# Patient Record
Sex: MALE | Race: WHITE | Employment: OTHER | URBAN - NONMETROPOLITAN AREA
[De-identification: names, ages, dates, MRNs, and addresses within clinical notes are randomized per-mention and may not be internally consistent; named-entity substitution may affect disease eponyms.]

---

## 2018-06-25 ENCOUNTER — OFFICE VISIT (OUTPATIENT)
Dept: ORTHOPEDIC SURGERY | Age: 68
End: 2018-06-25

## 2018-06-25 VITALS
WEIGHT: 165 LBS | BODY MASS INDEX: 23.62 KG/M2 | DIASTOLIC BLOOD PRESSURE: 72 MMHG | SYSTOLIC BLOOD PRESSURE: 162 MMHG | HEART RATE: 87 BPM | HEIGHT: 70 IN

## 2018-06-25 DIAGNOSIS — M67.919 ROTATOR CUFF DISORDER, UNSPECIFIED LATERALITY: Primary | ICD-10-CM

## 2018-06-25 DIAGNOSIS — M70.21 OLECRANON BURSITIS OF RIGHT ELBOW: ICD-10-CM

## 2018-06-25 PROBLEM — K80.10 CHRONIC CALCULOUS CHOLECYSTITIS: Status: ACTIVE | Noted: 2018-06-25

## 2018-06-25 PROCEDURE — G8420 CALC BMI NORM PARAMETERS: HCPCS | Performed by: ORTHOPAEDIC SURGERY

## 2018-06-25 PROCEDURE — 4040F PNEUMOC VAC/ADMIN/RCVD: CPT | Performed by: ORTHOPAEDIC SURGERY

## 2018-06-25 PROCEDURE — 4004F PT TOBACCO SCREEN RCVD TLK: CPT | Performed by: ORTHOPAEDIC SURGERY

## 2018-06-25 PROCEDURE — 3017F COLORECTAL CA SCREEN DOC REV: CPT | Performed by: ORTHOPAEDIC SURGERY

## 2018-06-25 PROCEDURE — 1123F ACP DISCUSS/DSCN MKR DOCD: CPT | Performed by: ORTHOPAEDIC SURGERY

## 2018-06-25 PROCEDURE — 99203 OFFICE O/P NEW LOW 30 MIN: CPT | Performed by: ORTHOPAEDIC SURGERY

## 2018-06-25 PROCEDURE — G8427 DOCREV CUR MEDS BY ELIG CLIN: HCPCS | Performed by: ORTHOPAEDIC SURGERY

## 2018-06-25 RX ORDER — INSULIN GLARGINE 100 [IU]/ML
12 INJECTION, SOLUTION SUBCUTANEOUS DAILY
COMMUNITY
Start: 2018-05-11 | End: 2019-04-25 | Stop reason: ALTCHOICE

## 2018-06-25 RX ORDER — ATORVASTATIN CALCIUM 80 MG/1
80 TABLET, FILM COATED ORAL DAILY
COMMUNITY
Start: 2018-06-21

## 2018-07-10 ENCOUNTER — OFFICE VISIT (OUTPATIENT)
Dept: ORTHOPEDIC SURGERY | Age: 68
End: 2018-07-10

## 2018-07-10 VITALS
HEART RATE: 77 BPM | BODY MASS INDEX: 23.62 KG/M2 | HEIGHT: 70 IN | DIASTOLIC BLOOD PRESSURE: 97 MMHG | WEIGHT: 165 LBS | SYSTOLIC BLOOD PRESSURE: 174 MMHG

## 2018-07-10 DIAGNOSIS — Z72.0 TOBACCO ABUSE: ICD-10-CM

## 2018-07-10 DIAGNOSIS — M75.121 COMPLETE TEAR OF RIGHT ROTATOR CUFF: Primary | ICD-10-CM

## 2018-07-10 PROCEDURE — L3670 SO ACRO/CLAV CAN WEB PRE OTS: HCPCS | Performed by: ORTHOPAEDIC SURGERY

## 2018-07-10 PROCEDURE — 4040F PNEUMOC VAC/ADMIN/RCVD: CPT | Performed by: ORTHOPAEDIC SURGERY

## 2018-07-10 PROCEDURE — G8420 CALC BMI NORM PARAMETERS: HCPCS | Performed by: ORTHOPAEDIC SURGERY

## 2018-07-10 PROCEDURE — 3017F COLORECTAL CA SCREEN DOC REV: CPT | Performed by: ORTHOPAEDIC SURGERY

## 2018-07-10 PROCEDURE — 4000F TOBACCO USE TXMNT COUNSELING: CPT | Performed by: ORTHOPAEDIC SURGERY

## 2018-07-10 PROCEDURE — G8427 DOCREV CUR MEDS BY ELIG CLIN: HCPCS | Performed by: ORTHOPAEDIC SURGERY

## 2018-07-10 PROCEDURE — 99214 OFFICE O/P EST MOD 30 MIN: CPT | Performed by: ORTHOPAEDIC SURGERY

## 2018-07-10 PROCEDURE — 4004F PT TOBACCO SCREEN RCVD TLK: CPT | Performed by: ORTHOPAEDIC SURGERY

## 2018-07-10 PROCEDURE — 1123F ACP DISCUSS/DSCN MKR DOCD: CPT | Performed by: ORTHOPAEDIC SURGERY

## 2018-07-10 NOTE — PROGRESS NOTES
are negative. Strength is graded 5/5 in all muscle groups. The distal neurovascular exam is grossly intact. Cervical spine: The skin is warm and dry. There is no swelling, warmth, or erythema. Range of motion is within normal limits. There is no paraspinal or spinous process tenderness. Spurling's sign is negative and did not produce shoulder pain. The distal neurovascular exam is grossly intact. Additional Comments: Sensation is intact to light touch throughout the median, ulnar and radial nerve distribution. Able to wiggle fingers, give thumbs up, A-OK and cross index and middle fingers. X-RAYS:  3 views of the right shoulder are reviewed. There is no periosteal reaction, medullary lesions, or osteopenia. Glenohumeral space is well maintained, the acromioclavicular joint space is decreased joint space and moderate degenerative change. The Acromiohumeral space is approximately 6 mm. Type 1 acromion. The lung fields are clear. MRI images of the right shoulder impression reviewed and show:  Supraspinatus and subscapularis tendinosis. Complete full-thickness tear of   the supraspinatus with medial retraction of about 2.8 cm. Tear extends into   the anterior fibers of the infraspinatus. Full-thickness tear of the superior fibers subscapularis with retraction of torn fibers 1 cm. Interstitial tearing of the mid and inferior fibers with involvement of about 50% fiber thickness. Mild-to-moderate atrophy of the supraspinatus, infraspinatus, and subscapularis. Small glenohumeral joint effusion and synovitis. Subacromial subdeltoid fluid is well as subcoracoid and subscapularis recess fluid which appears somewhat loculated. Biceps tenosynovitis. Biceps tendon is perched on the lesser tuberosity without overt dislocation. Positive tension sign and stage II to 3 goutallier atrophy    Assessment:  Massive retracted rotator cuff tear    Impression:  Encounter Diagnoses   Name Primary?    

## 2018-08-07 NOTE — PROGRESS NOTES
Pt states unsure if he is having surgery. Wants to talk with PCP, he has an appointment with them 8/9, I told him we will call him 8/10 for PAT call.

## 2018-08-14 ENCOUNTER — ANESTHESIA EVENT (OUTPATIENT)
Dept: OPERATING ROOM | Age: 68
End: 2018-08-14
Payer: MEDICARE

## 2018-08-14 NOTE — ANESTHESIA PRE PROCEDURE
Past Surgical History:     CHOLECYSTECTOMY  2016    COLONOSCOPY      HAND SURGERY Right      Social History:     Smoking status: Current Every Day Smoker     Packs/day: 1.00    Smokeless tobacco: Never Used      Comment: Using the E cigarette    Alcohol use No      Comment: occasionally couple times a month                                Ready to quit: Not Answered;   Counseling given: Not Answered    Vital Signs (Current):  BP: 119/70  Pulse: 73 Resp: 16  SpO2: 99 Temp: 98  Height: 5' 10\" (1.778 m)  (08/15/18)  Weight: 170 lb (77.1 kg)  (08/15/18) BMI: 24.4    EK18  NSR 82; nl axis; low volts; 1stdegree AV Block  Stress SPECT: 2018   Satisfactory activity throughout the left ventricle myocardium at stress and at rest.  LVEF: 63 %; LV WALL MOTION: Normal without areas of hypo- or dyskinesia. NPO Status:  >8 hrs    Anesthesia Evaluation  Patient summary reviewed and Nursing notes reviewed  Airway: Mallampati: II  TM distance: >3 FB     Mouth opening: > = 3 FB Dental:          Pulmonary:                              Cardiovascular:  Exercise tolerance: good (>4 METS),   (+) hypertension:, hyperlipidemia    (-) CABG/stent, dysrhythmias and  ANGEL                Neuro/Psych:               GI/Hepatic/Renal:   (+) GERD:,           Endo/Other:    (+) DiabetesType II DM, using insulin, : arthritis: OA., .    (-) hypothyroidism               Abdominal:           Vascular:                                        Anesthesia Plan      general     ASA 3     (Right Interscalene Brachial Plexus Block for post-op pain management per surgeon request.)      MIPS: Prophylactic antiemetics administered. Anesthetic plan and risks discussed with patient.                 Calli Dixon MD

## 2018-08-15 ENCOUNTER — HOSPITAL ENCOUNTER (OUTPATIENT)
Age: 68
Setting detail: OUTPATIENT SURGERY
Discharge: HOME | End: 2018-08-15
Attending: ORTHOPAEDIC SURGERY | Admitting: ORTHOPAEDIC SURGERY
Payer: MEDICARE

## 2018-08-15 ENCOUNTER — ANESTHESIA (OUTPATIENT)
Dept: OPERATING ROOM | Age: 68
End: 2018-08-15
Payer: MEDICARE

## 2018-08-15 VITALS
BODY MASS INDEX: 24.34 KG/M2 | WEIGHT: 170 LBS | TEMPERATURE: 97 F | HEART RATE: 95 BPM | RESPIRATION RATE: 16 BRPM | HEIGHT: 70 IN | SYSTOLIC BLOOD PRESSURE: 133 MMHG | OXYGEN SATURATION: 94 % | DIASTOLIC BLOOD PRESSURE: 79 MMHG

## 2018-08-15 VITALS — DIASTOLIC BLOOD PRESSURE: 67 MMHG | OXYGEN SATURATION: 97 % | SYSTOLIC BLOOD PRESSURE: 117 MMHG

## 2018-08-15 DIAGNOSIS — M67.919 ROTATOR CUFF DISORDER, UNSPECIFIED LATERALITY: Primary | ICD-10-CM

## 2018-08-15 LAB
GLUCOSE BLD-MCNC: 155 MG/DL (ref 70–99)
GLUCOSE BLD-MCNC: 193 MG/DL (ref 70–99)
PERFORMED ON: ABNORMAL
PERFORMED ON: ABNORMAL

## 2018-08-15 PROCEDURE — 7100000010 HC PHASE II RECOVERY - FIRST 15 MIN: Performed by: ORTHOPAEDIC SURGERY

## 2018-08-15 PROCEDURE — 6360000002 HC RX W HCPCS: Performed by: ORTHOPAEDIC SURGERY

## 2018-08-15 PROCEDURE — 64415 NJX AA&/STRD BRCH PLXS IMG: CPT | Performed by: ANESTHESIOLOGY

## 2018-08-15 PROCEDURE — 6360000002 HC RX W HCPCS: Performed by: NURSE ANESTHETIST, CERTIFIED REGISTERED

## 2018-08-15 PROCEDURE — C1713 ANCHOR/SCREW BN/BN,TIS/BN: HCPCS | Performed by: ORTHOPAEDIC SURGERY

## 2018-08-15 PROCEDURE — 7100000001 HC PACU RECOVERY - ADDTL 15 MIN: Performed by: ORTHOPAEDIC SURGERY

## 2018-08-15 PROCEDURE — 7100000000 HC PACU RECOVERY - FIRST 15 MIN: Performed by: ORTHOPAEDIC SURGERY

## 2018-08-15 PROCEDURE — 2580000003 HC RX 258: Performed by: ANESTHESIOLOGY

## 2018-08-15 PROCEDURE — 3600000004 HC SURGERY LEVEL 4 BASE: Performed by: ORTHOPAEDIC SURGERY

## 2018-08-15 PROCEDURE — 3600000014 HC SURGERY LEVEL 4 ADDTL 15MIN: Performed by: ORTHOPAEDIC SURGERY

## 2018-08-15 PROCEDURE — 7100000011 HC PHASE II RECOVERY - ADDTL 15 MIN: Performed by: ORTHOPAEDIC SURGERY

## 2018-08-15 PROCEDURE — 2720000010 HC SURG SUPPLY STERILE: Performed by: ORTHOPAEDIC SURGERY

## 2018-08-15 PROCEDURE — 2709999900 HC NON-CHARGEABLE SUPPLY: Performed by: ORTHOPAEDIC SURGERY

## 2018-08-15 PROCEDURE — 3700000000 HC ANESTHESIA ATTENDED CARE: Performed by: ORTHOPAEDIC SURGERY

## 2018-08-15 PROCEDURE — 2500000003 HC RX 250 WO HCPCS: Performed by: NURSE ANESTHETIST, CERTIFIED REGISTERED

## 2018-08-15 PROCEDURE — 3700000001 HC ADD 15 MINUTES (ANESTHESIA): Performed by: ORTHOPAEDIC SURGERY

## 2018-08-15 PROCEDURE — 2500000003 HC RX 250 WO HCPCS: Performed by: ANESTHESIOLOGY

## 2018-08-15 DEVICE — SCREW INTFR L23MM DIA7MM BIOCRYL RAPIDE ABSRB MILAGRO ADV: Type: IMPLANTABLE DEVICE | Status: FUNCTIONAL

## 2018-08-15 DEVICE — SCREW BNE L18MM DIA4MM PERIARTC S STL ST LOK FULL THRD FOR: Type: IMPLANTABLE DEVICE | Status: FUNCTIONAL

## 2018-08-15 DEVICE — ANCHOR SUT DIA45MM BIOCRYL RAPIDE ABSRB 3 DYNACORD SZ 2: Type: IMPLANTABLE DEVICE | Status: FUNCTIONAL

## 2018-08-15 DEVICE — ANCHOR SUT DIA5.5MM BIOCRYL RAPIDE ABSRB KNOTLESS HEALIX: Type: IMPLANTABLE DEVICE | Status: FUNCTIONAL

## 2018-08-15 DEVICE — ANCHOR SUT DIA4.5MM BIOCRYL RAPIDE ABSRB 2 DYNACORD SZ 2: Type: IMPLANTABLE DEVICE | Status: FUNCTIONAL

## 2018-08-15 DEVICE — SUTURE ORTHOCORD SZ 2-0 VLT BLU MO-7 NDL MULTIPAK 223114: Type: IMPLANTABLE DEVICE | Status: FUNCTIONAL

## 2018-08-15 RX ORDER — OXYCODONE HYDROCHLORIDE AND ACETAMINOPHEN 5; 325 MG/1; MG/1
2 TABLET ORAL PRN
Status: DISCONTINUED | OUTPATIENT
Start: 2018-08-15 | End: 2018-08-15 | Stop reason: HOSPADM

## 2018-08-15 RX ORDER — MIDAZOLAM HYDROCHLORIDE 1 MG/ML
INJECTION INTRAMUSCULAR; INTRAVENOUS PRN
Status: DISCONTINUED | OUTPATIENT
Start: 2018-08-15 | End: 2018-08-15 | Stop reason: SDUPTHER

## 2018-08-15 RX ORDER — VITAMIN B COMPLEX
1 CAPSULE ORAL DAILY
COMMUNITY

## 2018-08-15 RX ORDER — EPHEDRINE SULFATE 50 MG/ML
INJECTION INTRAVENOUS PRN
Status: DISCONTINUED | OUTPATIENT
Start: 2018-08-15 | End: 2018-08-15 | Stop reason: SDUPTHER

## 2018-08-15 RX ORDER — ACETAMINOPHEN 10 MG/ML
1000 INJECTION, SOLUTION INTRAVENOUS ONCE
Status: COMPLETED | OUTPATIENT
Start: 2018-08-15 | End: 2018-08-15

## 2018-08-15 RX ORDER — OXYCODONE HYDROCHLORIDE AND ACETAMINOPHEN 5; 325 MG/1; MG/1
1 TABLET ORAL PRN
Status: DISCONTINUED | OUTPATIENT
Start: 2018-08-15 | End: 2018-08-15 | Stop reason: HOSPADM

## 2018-08-15 RX ORDER — HYDRALAZINE HYDROCHLORIDE 20 MG/ML
5 INJECTION INTRAMUSCULAR; INTRAVENOUS EVERY 10 MIN PRN
Status: DISCONTINUED | OUTPATIENT
Start: 2018-08-15 | End: 2018-08-15 | Stop reason: HOSPADM

## 2018-08-15 RX ORDER — ONDANSETRON 2 MG/ML
INJECTION INTRAMUSCULAR; INTRAVENOUS PRN
Status: DISCONTINUED | OUTPATIENT
Start: 2018-08-15 | End: 2018-08-15 | Stop reason: SDUPTHER

## 2018-08-15 RX ORDER — HYDROMORPHONE HCL 110MG/55ML
0.25 PATIENT CONTROLLED ANALGESIA SYRINGE INTRAVENOUS EVERY 5 MIN PRN
Status: DISCONTINUED | OUTPATIENT
Start: 2018-08-15 | End: 2018-08-15 | Stop reason: HOSPADM

## 2018-08-15 RX ORDER — DEXAMETHASONE SODIUM PHOSPHATE 4 MG/ML
INJECTION, SOLUTION INTRA-ARTICULAR; INTRALESIONAL; INTRAMUSCULAR; INTRAVENOUS; SOFT TISSUE PRN
Status: DISCONTINUED | OUTPATIENT
Start: 2018-08-15 | End: 2018-08-15 | Stop reason: SDUPTHER

## 2018-08-15 RX ORDER — SODIUM CHLORIDE, SODIUM LACTATE, POTASSIUM CHLORIDE, CALCIUM CHLORIDE 600; 310; 30; 20 MG/100ML; MG/100ML; MG/100ML; MG/100ML
INJECTION, SOLUTION INTRAVENOUS CONTINUOUS
Status: DISCONTINUED | OUTPATIENT
Start: 2018-08-15 | End: 2018-08-15 | Stop reason: HOSPADM

## 2018-08-15 RX ORDER — PROPOFOL 10 MG/ML
INJECTION, EMULSION INTRAVENOUS PRN
Status: DISCONTINUED | OUTPATIENT
Start: 2018-08-15 | End: 2018-08-15 | Stop reason: SDUPTHER

## 2018-08-15 RX ORDER — ROCURONIUM BROMIDE 10 MG/ML
INJECTION, SOLUTION INTRAVENOUS PRN
Status: DISCONTINUED | OUTPATIENT
Start: 2018-08-15 | End: 2018-08-15 | Stop reason: SDUPTHER

## 2018-08-15 RX ORDER — HYDROMORPHONE HCL 110MG/55ML
0.5 PATIENT CONTROLLED ANALGESIA SYRINGE INTRAVENOUS EVERY 5 MIN PRN
Status: DISCONTINUED | OUTPATIENT
Start: 2018-08-15 | End: 2018-08-15 | Stop reason: HOSPADM

## 2018-08-15 RX ORDER — PANTOPRAZOLE SODIUM 40 MG/1
40 TABLET, DELAYED RELEASE ORAL DAILY
COMMUNITY

## 2018-08-15 RX ORDER — OXYCODONE HYDROCHLORIDE AND ACETAMINOPHEN 5; 325 MG/1; MG/1
1 TABLET ORAL EVERY 6 HOURS PRN
Qty: 28 TABLET | Refills: 0 | Status: SHIPPED | OUTPATIENT
Start: 2018-08-15 | End: 2018-08-21 | Stop reason: SDUPTHER

## 2018-08-15 RX ORDER — ONDANSETRON 2 MG/ML
4 INJECTION INTRAMUSCULAR; INTRAVENOUS
Status: DISCONTINUED | OUTPATIENT
Start: 2018-08-15 | End: 2018-08-15 | Stop reason: HOSPADM

## 2018-08-15 RX ORDER — MEPERIDINE HYDROCHLORIDE 25 MG/ML
12.5 INJECTION INTRAMUSCULAR; INTRAVENOUS; SUBCUTANEOUS EVERY 5 MIN PRN
Status: DISCONTINUED | OUTPATIENT
Start: 2018-08-15 | End: 2018-08-15 | Stop reason: HOSPADM

## 2018-08-15 RX ORDER — LIDOCAINE HYDROCHLORIDE 20 MG/ML
INJECTION, SOLUTION INFILTRATION; PERINEURAL PRN
Status: DISCONTINUED | OUTPATIENT
Start: 2018-08-15 | End: 2018-08-15 | Stop reason: SDUPTHER

## 2018-08-15 RX ORDER — FENTANYL CITRATE 50 UG/ML
25 INJECTION, SOLUTION INTRAMUSCULAR; INTRAVENOUS EVERY 5 MIN PRN
Status: DISCONTINUED | OUTPATIENT
Start: 2018-08-15 | End: 2018-08-15 | Stop reason: HOSPADM

## 2018-08-15 RX ORDER — LIDOCAINE HYDROCHLORIDE 10 MG/ML
1 INJECTION, SOLUTION EPIDURAL; INFILTRATION; INTRACAUDAL; PERINEURAL
Status: COMPLETED | OUTPATIENT
Start: 2018-08-15 | End: 2018-08-15

## 2018-08-15 RX ORDER — FENTANYL CITRATE 50 UG/ML
INJECTION, SOLUTION INTRAMUSCULAR; INTRAVENOUS PRN
Status: DISCONTINUED | OUTPATIENT
Start: 2018-08-15 | End: 2018-08-15 | Stop reason: SDUPTHER

## 2018-08-15 RX ADMIN — SODIUM CHLORIDE, POTASSIUM CHLORIDE, SODIUM LACTATE AND CALCIUM CHLORIDE: 600; 310; 30; 20 INJECTION, SOLUTION INTRAVENOUS at 09:29

## 2018-08-15 RX ADMIN — MIDAZOLAM 2 MG: 1 INJECTION INTRAMUSCULAR; INTRAVENOUS at 07:33

## 2018-08-15 RX ADMIN — EPHEDRINE SULFATE 5 MG: 50 INJECTION INTRAVENOUS at 08:20

## 2018-08-15 RX ADMIN — SUGAMMADEX 150 MG: 100 INJECTION, SOLUTION INTRAVENOUS at 10:43

## 2018-08-15 RX ADMIN — PROPOFOL 150 MG: 10 INJECTION, EMULSION INTRAVENOUS at 07:57

## 2018-08-15 RX ADMIN — Medication 2 G: at 07:47

## 2018-08-15 RX ADMIN — ROCURONIUM BROMIDE 50 MG: 10 INJECTION, SOLUTION INTRAVENOUS at 07:57

## 2018-08-15 RX ADMIN — LIDOCAINE HYDROCHLORIDE 0.1 ML: 10 INJECTION, SOLUTION EPIDURAL; INFILTRATION; INTRACAUDAL; PERINEURAL at 07:09

## 2018-08-15 RX ADMIN — PHENYLEPHRINE HYDROCHLORIDE 100 MCG: 10 INJECTION INTRAVENOUS at 08:10

## 2018-08-15 RX ADMIN — EPHEDRINE SULFATE 5 MG: 50 INJECTION INTRAVENOUS at 08:17

## 2018-08-15 RX ADMIN — EPHEDRINE SULFATE 5 MG: 50 INJECTION INTRAVENOUS at 08:46

## 2018-08-15 RX ADMIN — DEXAMETHASONE SODIUM PHOSPHATE 4 MG: 4 INJECTION, SOLUTION INTRAMUSCULAR; INTRAVENOUS at 08:15

## 2018-08-15 RX ADMIN — LIDOCAINE HYDROCHLORIDE 40 MG: 20 INJECTION, SOLUTION INFILTRATION; PERINEURAL at 07:57

## 2018-08-15 RX ADMIN — PHENYLEPHRINE HYDROCHLORIDE 100 MCG: 10 INJECTION INTRAVENOUS at 10:08

## 2018-08-15 RX ADMIN — EPHEDRINE SULFATE 5 MG: 50 INJECTION INTRAVENOUS at 09:03

## 2018-08-15 RX ADMIN — EPHEDRINE SULFATE 5 MG: 50 INJECTION INTRAVENOUS at 09:11

## 2018-08-15 RX ADMIN — ACETAMINOPHEN 1000 MG: 10 INJECTION, SOLUTION INTRAVENOUS at 07:10

## 2018-08-15 RX ADMIN — FENTANYL CITRATE 50 MCG: 50 INJECTION INTRAMUSCULAR; INTRAVENOUS at 07:57

## 2018-08-15 RX ADMIN — SODIUM CHLORIDE, POTASSIUM CHLORIDE, SODIUM LACTATE AND CALCIUM CHLORIDE: 600; 310; 30; 20 INJECTION, SOLUTION INTRAVENOUS at 07:09

## 2018-08-15 RX ADMIN — EPHEDRINE SULFATE 5 MG: 50 INJECTION INTRAVENOUS at 10:00

## 2018-08-15 RX ADMIN — EPHEDRINE SULFATE 10 MG: 50 INJECTION INTRAVENOUS at 08:27

## 2018-08-15 RX ADMIN — ONDANSETRON 4 MG: 2 INJECTION, SOLUTION INTRAMUSCULAR; INTRAVENOUS at 10:42

## 2018-08-15 RX ADMIN — FENTANYL CITRATE 50 MCG: 50 INJECTION INTRAMUSCULAR; INTRAVENOUS at 07:33

## 2018-08-15 RX ADMIN — EPHEDRINE SULFATE 5 MG: 50 INJECTION INTRAVENOUS at 09:13

## 2018-08-15 RX ADMIN — EPHEDRINE SULFATE 5 MG: 50 INJECTION INTRAVENOUS at 08:39

## 2018-08-15 ASSESSMENT — PULMONARY FUNCTION TESTS
PIF_VALUE: 20
PIF_VALUE: 20
PIF_VALUE: 19
PIF_VALUE: 22
PIF_VALUE: 0
PIF_VALUE: 20
PIF_VALUE: 12
PIF_VALUE: 21
PIF_VALUE: 3
PIF_VALUE: 19
PIF_VALUE: 19
PIF_VALUE: 3
PIF_VALUE: 20
PIF_VALUE: 20
PIF_VALUE: 19
PIF_VALUE: 21
PIF_VALUE: 19
PIF_VALUE: 22
PIF_VALUE: 19
PIF_VALUE: 20
PIF_VALUE: 20
PIF_VALUE: 22
PIF_VALUE: 19
PIF_VALUE: 21
PIF_VALUE: 21
PIF_VALUE: 13
PIF_VALUE: 21
PIF_VALUE: 20
PIF_VALUE: 19
PIF_VALUE: 1
PIF_VALUE: 3
PIF_VALUE: 21
PIF_VALUE: 20
PIF_VALUE: 18
PIF_VALUE: 21
PIF_VALUE: 21
PIF_VALUE: 20
PIF_VALUE: 20
PIF_VALUE: 19
PIF_VALUE: 19
PIF_VALUE: 20
PIF_VALUE: 19
PIF_VALUE: 20
PIF_VALUE: 21
PIF_VALUE: 19
PIF_VALUE: 6
PIF_VALUE: 0
PIF_VALUE: 20
PIF_VALUE: 21
PIF_VALUE: 19
PIF_VALUE: 20
PIF_VALUE: 22
PIF_VALUE: 20
PIF_VALUE: 20
PIF_VALUE: 13
PIF_VALUE: 19
PIF_VALUE: 24
PIF_VALUE: 20
PIF_VALUE: 22
PIF_VALUE: 19
PIF_VALUE: 13
PIF_VALUE: 20
PIF_VALUE: 21
PIF_VALUE: 20
PIF_VALUE: 20
PIF_VALUE: 19
PIF_VALUE: 20
PIF_VALUE: 18
PIF_VALUE: 22
PIF_VALUE: 20
PIF_VALUE: 2
PIF_VALUE: 21
PIF_VALUE: 22
PIF_VALUE: 20
PIF_VALUE: 20
PIF_VALUE: 19
PIF_VALUE: 19
PIF_VALUE: 21
PIF_VALUE: 13
PIF_VALUE: 19
PIF_VALUE: 21
PIF_VALUE: 19
PIF_VALUE: 19
PIF_VALUE: 21
PIF_VALUE: 19
PIF_VALUE: 20
PIF_VALUE: 20
PIF_VALUE: 19
PIF_VALUE: 21
PIF_VALUE: 20
PIF_VALUE: 13
PIF_VALUE: 21
PIF_VALUE: 20
PIF_VALUE: 19
PIF_VALUE: 21
PIF_VALUE: 20
PIF_VALUE: 20
PIF_VALUE: 4
PIF_VALUE: 19
PIF_VALUE: 19
PIF_VALUE: 22
PIF_VALUE: 21
PIF_VALUE: 22
PIF_VALUE: 21
PIF_VALUE: 1
PIF_VALUE: 20
PIF_VALUE: 12
PIF_VALUE: 20
PIF_VALUE: 19
PIF_VALUE: 19
PIF_VALUE: 20
PIF_VALUE: 19
PIF_VALUE: 20
PIF_VALUE: 20
PIF_VALUE: 19
PIF_VALUE: 20
PIF_VALUE: 21
PIF_VALUE: 20
PIF_VALUE: 4
PIF_VALUE: 20
PIF_VALUE: 20
PIF_VALUE: 13
PIF_VALUE: 19
PIF_VALUE: 20
PIF_VALUE: 20
PIF_VALUE: 19
PIF_VALUE: 19
PIF_VALUE: 13
PIF_VALUE: 20
PIF_VALUE: 12
PIF_VALUE: 20
PIF_VALUE: 21
PIF_VALUE: 20
PIF_VALUE: 19
PIF_VALUE: 20
PIF_VALUE: 22
PIF_VALUE: 20
PIF_VALUE: 19
PIF_VALUE: 22
PIF_VALUE: 19
PIF_VALUE: 20
PIF_VALUE: 20
PIF_VALUE: 19
PIF_VALUE: 0
PIF_VALUE: 19
PIF_VALUE: 21
PIF_VALUE: 22
PIF_VALUE: 19
PIF_VALUE: 22
PIF_VALUE: 19
PIF_VALUE: 20
PIF_VALUE: 1
PIF_VALUE: 19
PIF_VALUE: 20
PIF_VALUE: 21
PIF_VALUE: 21
PIF_VALUE: 19
PIF_VALUE: 20
PIF_VALUE: 20
PIF_VALUE: 22
PIF_VALUE: 20
PIF_VALUE: 19

## 2018-08-15 ASSESSMENT — PAIN DESCRIPTION - DESCRIPTORS: DESCRIPTORS: ACHING;THROBBING

## 2018-08-15 ASSESSMENT — PAIN - FUNCTIONAL ASSESSMENT: PAIN_FUNCTIONAL_ASSESSMENT: 0-10

## 2018-08-15 NOTE — OP NOTE
standard fashion. The forearm was well padded and secured in the [employer] Spider extremity positioning device. A standard midglenoid posterior portal was established. The trocar and cannula were inserted. The trocar was removed and the arthroscope and inflow inserted. Systematic arthroscopy was performed and the findings are summarized below. Standard anterior portal was made with outside in technique. 1.  Superior Labrum/Biceps Tendon-  Intact biceps tendon with fraying and medial subluxation. No evidence of labral fraying  2. Anterior/Posterior Labrum- Intact without fraying  3. Rotator Cuff- The subscapularis tendon showed full thickness tear of the proximal portion. There was a full thickness tear of the supraspinatus that extended into the infraspinatus tendon  4. Inferior Axillary Recess-  No loose bodies  5. Articular Surfaces-  Intact    Intra-articular Debridement  Biceps was released from the superior glenoid tubercle. Long Head Biceps Tenodesis   A spinal needle was placed in the biceps tendon to maintain length. With the arthroscope in the lateral portal, the bicipital sheath was identified and opened. Hemostasis was controlled with electrocautery. An accessory portal was made above the axillary crease. A guide pin was drilled within the bicipital groove. An acPneumRx reamer was used to create a 25-30 mm socket. A  hole was drilled just distal to the previously reamed socket and a josé was passed from inferior to superior then retrieved out the socket. A luggage tag suture was placed around the biceps with the antegrade suture passer and threaded into the josé which was then pulled out the inferior hole. The tendon was pulled into the socket & help in place with tension on the suture and a nitinol wire. A 7x23 madeline interference screw was placed over the nitinol wire and was carefully inserted taking care not to damage the tendon.   The tendon was probed to confirm that the tendon was removed and the portals closed with 3-0 Nylon. Sterile dressings were applied. A sling was applied. The patient was awakened and taken to the postoperative area in stable condition.     Henny Bull

## 2018-08-15 NOTE — ANESTHESIA POSTPROCEDURE EVALUATION
Department of Anesthesiology  Postprocedure Note    Patient: Lara Interiano  MRN: [id number]  YOB: 1950  Date of evaluation: 8/15/2018  Time:  12:46 PM     Procedure Summary     Date:  08/15/18 Room / Location:  [address]    Anesthesia Start:  3139 Anesthesia Stop:  1108    Procedure:  RIGHT SHOULDER VIDEO ARTHROSCOPY, ROTATOR CUFF REPAIR, AUGMENTATION WITH ROTATION MEDICAL GRAFT, BICEPS TENODESIS, SUBACROMIAL DECOMPRESSION (Right Shoulder) Diagnosis:  (COMPLETE TEAR OF RIGHT ROTATOR CUFF)    Surgeon:  Cory Bonilla DO Responsible Provider:  Jose Elias Estrada MD    Anesthesia Type:  general ASA Status:  3     Anesthesia Type: general    Adama Phase I: Adama Score: 10    Adama Phase II: Adama Score: 10     Anesthesia Post Evaluation   Anesthetic Problems: no   Last Vitals:   BP: 133/79 Pulse: 95   Resp: 16 SpO2: 94   Temp: 97     Cardiovascular System Stable: yes  Respiratory Function: Airway Patent yes  ETT no  Ventilator no  Level of consciousness: awake, alert and oriented  Post-op pain: adequate analgesia  Hydration Adequate: yes  Nausea/Vomiting:no  Other Issues:     Yina Pitt MD

## 2018-08-15 NOTE — ANESTHESIA PROCEDURE NOTES
Peripheral Block    Patient location during procedure: PACU  Start time: 8/15/2018 7:25 AM  End time: 8/15/2018 7:33 AM  Staffing  Anesthesiologist: Doug Guillen  Performed: anesthesiologist   Preanesthetic Checklist  Completed: patient identified, site marked, surgical consent, pre-op evaluation, timeout performed, IV checked, risks and benefits discussed, monitors and equipment checked, anesthesia consent given, oxygen available and patient being monitored  Peripheral Block  Patient position: sitting  Prep: ChloraPrep  Patient monitoring: cardiac monitor, continuous pulse ox, frequent blood pressure checks and IV access  Block type: Brachial plexus  Laterality: right  Injection technique: single-shot  Procedures: ultrasound guided  Local infiltration: lidocaine  Infiltration strength: 2 %  Dose: 2 mL  Interscalene  Provider prep: mask and sterile gloves  Local infiltration: lidocaine  Needle  Needle gauge: 21 G  Needle length: 10 cm  Needle localization: anatomical landmarks and ultrasound guidance  Assessment  Injection assessment: negative aspiration for heme, no paresthesia on injection and local visualized surrounding nerve on ultrasound  Slow fractionated injection: yes  Hemodynamics: stable  Additional Notes  Right Interscalene Brachial Plexus Block:  Patient ID'd and Marked. Sedated with Midazolam and Fentanyl. Chlorhex prep to shoulder and neck. Bupivacaine 0.5% 5 ml sc along thw deltopectoral groove. Then, lido 2% 2 ml sc prior to placement of the block needle. Needle advanced under U/S guidance and 25 ml of Bupivacaine 0.5% injected in 5 ml aliquots. Patient tolerated well.   Reason for block: post-op pain management

## 2018-08-16 DIAGNOSIS — M67.919 ROTATOR CUFF DISORDER, UNSPECIFIED LATERALITY: Primary | ICD-10-CM

## 2018-08-21 ENCOUNTER — HOSPITAL ENCOUNTER (OUTPATIENT)
Dept: PHYSICAL THERAPY | Age: 68
Setting detail: THERAPIES SERIES
Discharge: HOME | End: 2018-08-21
Payer: MEDICARE

## 2018-08-21 ENCOUNTER — OFFICE VISIT (OUTPATIENT)
Dept: ORTHOPEDIC SURGERY | Age: 68
End: 2018-08-21

## 2018-08-21 VITALS — HEIGHT: 70 IN | BODY MASS INDEX: 23.62 KG/M2 | WEIGHT: 165 LBS

## 2018-08-21 DIAGNOSIS — M67.919 ROTATOR CUFF DISORDER, UNSPECIFIED LATERALITY: ICD-10-CM

## 2018-08-21 DIAGNOSIS — Z98.890 STATUS POST RIGHT ROTATOR CUFF REPAIR: Primary | ICD-10-CM

## 2018-08-21 PROCEDURE — 99024 POSTOP FOLLOW-UP VISIT: CPT | Performed by: ORTHOPAEDIC SURGERY

## 2018-08-21 PROCEDURE — G8985 CARRY GOAL STATUS: HCPCS

## 2018-08-21 PROCEDURE — G8984 CARRY CURRENT STATUS: HCPCS

## 2018-08-21 PROCEDURE — 97110 THERAPEUTIC EXERCISES: CPT

## 2018-08-21 PROCEDURE — 97161 PT EVAL LOW COMPLEX 20 MIN: CPT

## 2018-08-21 RX ORDER — OXYCODONE HYDROCHLORIDE AND ACETAMINOPHEN 5; 325 MG/1; MG/1
1 TABLET ORAL EVERY 6 HOURS PRN
Qty: 28 TABLET | Refills: 0 | Status: SHIPPED | OUTPATIENT
Start: 2018-08-21 | End: 2018-08-30 | Stop reason: SDUPTHER

## 2018-08-21 NOTE — FLOWSHEET NOTE
[address] [place] [address] and [facility]    Physical Therapy Daily Treatment Note  Date:  2018    Patient Name:  Christo Spencer    :  1950  MRN: [id number]  Medical/Treatment Diagnosis Information:  · Diagnosis:  Massive RTC Repair, Biceps Tenodesis, SAD 8/15/18  · Treatment Diagnosis: A83.321  Insurance/Certification information:  PT Insurance Information: Medicare/Medicaid  Physician Information:  Referring Practitioner: Aidan Whatley of care signed (Y/N):     Date of Patient follow up with Physician:  2018    G-Code (if applicable):      Date G-Code Applied:  2018  PT G-Codes  Functional Assessment Tool Used: Rajani Brown 11  Score: 100%  Functional Limitation: Carrying, moving and handling objects  Carrying, Moving and Handling Objects Current Status (): 100 percent impaired, limited or restricted  Carrying, Moving and Handling Objects Goal Status ():  At least 1 percent but less than 20 percent impaired, limited or restricted    Progress Note: [x]  Yes  []  No      Latex Allergy:  [x]NO      []YES     Preferred Language for Healthcare:   [x]English       []other:     Visit # Insurance Allowable   1 Med Nec     Pain level:  10/10     SUBJECTIVE:  See eval    OBJECTIVE: See eval  Observation:   Test measurements:    Patient educated on following:    RESTRICTIONS/PRECAUTIONS: Massive RTC repair    Exercises/Interventions:   Therapeutic Ex Wt/Sets/Reps/Hold Notes   Pulley          Shrugs 20 x    Shoulder Pinches  20 x    PROM Shoulder flex 10 x    PROM Shoulder ER 10 x    Ball Squeezes 20 x                                                           Manual     Gr I-II mobs for tissue reactivity     PROM-all planes 5' Flex only, pt not tolerate others severe increase in pain   GR III-IV mobs for arthrokinematics     Cervical/long axis distraction     Thoracic PA mobs     PNF for strengthening     PNF for agonist/antagonist inhibition          Pt education 10' Pt ed with precautions and HEP and proper performance     Therapeutic Exercise and NMR EXR  [x] (85322) Provided verbal/tactile cueing for activities related to strengthening, flexibility, endurance, ROM  for improvements in scapular, scapulothoracic and UE control with self care, reaching, carrying, lifting, house/yardwork, driving/computer work. [x] (04733) Provided verbal/tactile cueing for activities related to improving balance, coordination, kinesthetic sense, posture, motor skill, proprioception  to assist with  scapular, scapulothoracic and UE control with self care, reaching, carrying, lifting, house/yardwork, driving/computer work.     Home Exercise Program:    [x] (38159) Reviewed/Progressed HEP activities related to strengthening, flexibility, endurance, ROM of scapular, scapulothoracic and UE control with self care, reaching, carrying, lifting, house/yardwork, driving/computer work  [x] (17921) Reviewed/Progressed HEP activities related to improving balance, coordination, kinesthetic sense, posture, motor skill, proprioception of scapular, scapulothoracic and UE control with self care, reaching, carrying, lifting, house/yardwork, driving/computer work      Manual Treatments:  PROM / STM / Oscillations-Mobs:  G-I, II, III, IV (PA's, Inf., Post.)  [x] (38396) Provided manual therapy to mobilize soft tissue/joints of cervical/CT, scapular GHJ and UE for the purpose of modulating pain, promoting relaxation,  increasing ROM, reducing/eliminating soft tissue swelling/inflammation/restriction, improving soft tissue extensibility and allowing for proper ROM for normal function with self care, reaching, carrying, lifting, house/yardwork, driving/computer work    Modalities:  CP x 10'    Charges:  Timed Code Treatment Minutes: 15'   Total Treatment Minutes: 39'     [x] EVAL (LOW) 76965 (typically 20 minutes face-to-face)  [] EVAL (MOD) 21686 (typically 30 minutes face-to-face)  [] EVAL (HIGH) 93666 (typically 45 minutes face-to-face)  [] RE-EVAL     [x] ZE(45085) x  1   [] Ionto  [] NMR (17372) x      [] Vaso  [] Manual (28194) x       [] Mech Traction  [] ES (unattended):   [x] Other: CP     GOALS:  Short Term Goals: To be achieved in: 2 weeks  1. Independent in HEP and progression per patient tolerance, in order to prevent re-injury. 2. Patient will have a decrease in pain to facilitate improvement in movement, function, and ADLs as indicated by Functional Deficits.     Long Term Goals: To be achieved in: 10 weeks  1. Disability index score of 10% or less for the Quick Dash to assist with reaching prior level of function. 2. Patient will demonstrate increased AROM to equal the opposite side bilaterally to allow for proper joint functioning as indicated by patients Functional Deficits. 3. Patient will demonstrate an increase in strength of R UE = L UE to allow for proper functional mobility as indicated by patients Functional Deficits. 4. Patient will return to all transfers, work activities, and functional activities without increased symptoms or restriction. 5. Patient will have 0/10 pain with ADL's.  6. Patient stated goal: to have complete recovery and get back to doing everything done before sx.                                      Goals that are underlined signify the goal has been accomplished. Progression Towards Functional goals:  [] Patient is progressing as expected towards functional goals listed. [] Progression is slowed due to complexities listed. [] Progression has been slowed due to co-morbidities.   [x] Plan just implemented, too soon to assess goals progression  [] Other:     ASSESSMENT:  See eval    Treatment/Activity Tolerance:   [x] Patient tolerated treatment well [] Patient limited by fatique  [] Patient limited by pain  [] Patient limited by other medical complications  [] Other:     Prognosis: [x] Good [] Fair  [] Poor    Patient Requires Follow-up: [x] Yes  [] No    PLAN: See eval  [] Continue per plan of care [] Alter current plan (see comments)  [x] Plan of care initiated [] Hold pending MD visit [] Discharge    Electronically signed by: Akanksha Baum PT

## 2018-08-21 NOTE — PLAN OF CARE
100 percent impaired, limited or restricted  Carrying, Moving and Handling Objects Goal Status (): At least 1 percent but less than 20 percent impaired, limited or restricted    Pain Scale: 10/10  Easing factors: ice, medication  Provocative factors: positioning, any movement    Type: [x]Constant   []Intermittent  []Radiating []Localized []other:     Numbness/Tingling: N&T in hand/fingers    Functional Limitations/Impairments: [x]Lifting/reaching [x]Grooming [x]Carrying    [x]ADL's [x]Driving [x]Sports/Recreations   [x]Other: interference with sleep    Occupation/School: Retired construction/farming    Living Status/Prior Level of Function: Independent with ADLs and IADLs. OBJECTIVE:     CERV ROM     Cervical Flexion WFL    Cervical Extension WFL    Cervical SB WFL    Cervical rotation WFL         ROM Left Right   Shoulder Flex  49 °    Shoulder Abd  45 °    Shoulder ER  Unable to tolerate any PROM   Shoulder IR  Unable to tolerate any PROM   Elbow Flex     Elbow Ext     Wrist Flex     Wrist Ext     Strength  Left Right   Shoulder Flex 4/5 N.T. Shoulder Scap 4/5 N.T. Shoulder ER 4/5 N.T. Shoulder IR 4/5 N.T.   Elbow Flex 4+/5 N.T.   Elbow Ext 4+/5 N.T.   Wrist Flex 4+/5 N.T.   Wrist Ext 4+/5 N.T.   Isaias   N.T. Reflexes/Sensation (myotomes/dermatomes):    [x]Normal    []Abnormal:      Joint mobility:    [x]Normal    []Hypo   []Hyper    Palpation: increased pain and ache with mild palpation    Functional Mobility/Transfers: slowed due to non-use of R UE    Posture:  Forward shoulders with R shoulder guard     Bandages/Dressings/Incisions: surgical inscisions with steri strips    Gait (include devices/WB status): WNL    Orthopedic Special Tests: n/a    Provocative Test Right Left    Shoulder Positive Negative Positive Negative   Jerry-Elias [] [] [] []   Empty Can [] [] [] []   Cross arm test [] [] [] []   Sanches's [] [] [] []   Apprehension test [] [] [] []   Speed's/Yergason's [] [] [] [] Provocative Test Right Left    Cervical/Thoracic  Positive Negative Positive Negative   Sharp-Adilson [] [] [] []   Alar ligament test [] [] [] []   Compression/distraction [] [] [] []   Jump sign (MTRP's) [] [] [] []   Adson's/Alex's [] [] [] []   Felipa's [] [] [] []                          [x] Patient history, allergies, meds reviewed. Medical chart reviewed. See intake form. Review Of Systems (ROS):  [x]Performed Review of systems (Integumentary, CardioPulmonary, Neurological) by intake and observation. Intake form has been scanned into medical record. Patient has been instructed to contact their primary care physician regarding ROS issues if not already being addressed at this time.       Co-morbidities/Complexities (which will affect course of rehabilitation):   []None           Arthritic conditions   []Rheumatoid arthritis (M05.9)  []Osteoarthritis (M19.91)   Cardiovascular conditions   []Hypertension (I10)  []Hyperlipidemia (E78.5)  []Angina pectoris (I20)  []Atherosclerosis (I70)   Musculoskeletal conditions   []Disc pathology   []Congenital spine pathologies   []Prior surgical intervention  []Osteoporosis (M81.8)  []Osteopenia (M85.8)   Endocrine conditions   []Hypothyroid (E03.9)  []Hyperthyroid Gastrointestinal conditions   []Constipation (G96.09)   Metabolic conditions   []Morbid obesity (E66.01)  [x]Diabetes type 1(E10.65) or 2 (E11.65)   []Neuropathy (G60.9)     Pulmonary conditions   []Asthma (J45)  []Coughing   []COPD (J44.9)   Psychological Disorders  [x]Anxiety (F41.9)  [x]Depression (F32.9)   []Other:   []Other:          Barriers to/and or personal factors that will affect rehab potential:              []Age  []Sex              []Motivation/Lack of Motivation                        [x]Co-Morbidities              []Cognitive Function, education/learning barriers              []Environmental, home barriers              []profession/work barriers  []past PT/medical

## 2018-08-30 DIAGNOSIS — M67.919 ROTATOR CUFF DISORDER, UNSPECIFIED LATERALITY: ICD-10-CM

## 2018-08-30 RX ORDER — OXYCODONE HYDROCHLORIDE AND ACETAMINOPHEN 5; 325 MG/1; MG/1
1 TABLET ORAL EVERY 6 HOURS PRN
Qty: 28 TABLET | Refills: 0 | Status: SHIPPED | OUTPATIENT
Start: 2018-08-30 | End: 2018-09-06 | Stop reason: SDUPTHER

## 2018-09-06 DIAGNOSIS — M67.919 ROTATOR CUFF DISORDER, UNSPECIFIED LATERALITY: ICD-10-CM

## 2018-09-07 RX ORDER — OXYCODONE HYDROCHLORIDE AND ACETAMINOPHEN 5; 325 MG/1; MG/1
1 TABLET ORAL EVERY 6 HOURS PRN
Qty: 28 TABLET | Refills: 0 | Status: SHIPPED | OUTPATIENT
Start: 2018-09-07 | End: 2018-09-17 | Stop reason: SDUPTHER

## 2018-09-11 ENCOUNTER — HOSPITAL ENCOUNTER (OUTPATIENT)
Dept: PHYSICAL THERAPY | Age: 68
Setting detail: THERAPIES SERIES
Discharge: HOME | End: 2018-09-11
Payer: MEDICARE

## 2018-09-11 PROCEDURE — 97140 MANUAL THERAPY 1/> REGIONS: CPT

## 2018-09-11 PROCEDURE — 97110 THERAPEUTIC EXERCISES: CPT

## 2018-09-11 NOTE — FLOWSHEET NOTE
[address] [place] [address] [facility]    Physical Therapy Daily Treatment Note  Date:  2018    Patient Name:  Fadi Toscano    :  1950  MRN: [id number]  Medical/Treatment Diagnosis Information:   Diagnosis:  Massive RTC Repair, Biceps Tenodesis, SAD 8/15/18  Treatment Diagnosis: L71.512   Insurance/Certification information: PT Insurance Information: Medicare/Medicaid    Physician Information:   Referring Practitioner: Maile Pump of care signed (Y/N):     Date of Patient follow up with Physician:  2018    G-Code (if applicable):      Date G-Code Applied:  2018       Progress Note: [x]  Yes  []  No      Latex Allergy:  [x]NO      []YES     Preferred Language for Healthcare:   [x]English       []other:     Visit # Insurance Allowable   2 Med Nec     Pain level:  8-9/10     SUBJECTIVE:  Patient reports pain still being way up there. Patient reports having been doing HEP but having trouble getting arm up with PROM Flex.     OBJECTIVE: See eval  Observation:   Test measurements:    Patient educated on following:    RESTRICTIONS/PRECAUTIONS: Massive RTC repair    Exercises/Interventions:   Therapeutic Ex Wt/Sets/Reps/Hold Notes   Pulley          Shrugs 20 x    Shoulder Pinches  20 x    PROM Shoulder flex 10 x    PROM Shoulder ER 10 x    Ball Squeezes 20 x                                                           Manual     Gr I-II mobs for tissue reactivity     PROM-all planes 10' Flex only, pt not tolerate others severe increase in pain   GR III-IV mobs for arthrokinematics     Cervical/long axis distraction     Thoracic PA mobs     PNF for strengthening     PNF for agonist/antagonist inhibition          Pt education 10' Pt ed with precautions and HEP and proper performance     Therapeutic Exercise and NMR EXR  [x] (31894) Provided verbal/tactile cueing for activities related to strengthening, flexibility, endurance, ROM  for improvements in scapular, scapulothoracic and UE control with self care, reaching, carrying, lifting, house/yardwork, driving/computer work. [x] (85875) Provided verbal/tactile cueing for activities related to improving balance, coordination, kinesthetic sense, posture, motor skill, proprioception  to assist with  scapular, scapulothoracic and UE control with self care, reaching, carrying, lifting, house/yardwork, driving/computer work. Home Exercise Program:    [x] (68438) Reviewed/Progressed HEP activities related to strengthening, flexibility, endurance, ROM of scapular, scapulothoracic and UE control with self care, reaching, carrying, lifting, house/yardwork, driving/computer work  [x] (35749) Reviewed/Progressed HEP activities related to improving balance, coordination, kinesthetic sense, posture, motor skill, proprioception of scapular, scapulothoracic and UE control with self care, reaching, carrying, lifting, house/yardwork, driving/computer work      Manual Treatments:  PROM / STM / Oscillations-Mobs:  G-I, II, III, IV (PA's, Inf., Post.)  [x] (29248) Provided manual therapy to mobilize soft tissue/joints of cervical/CT, scapular GHJ and UE for the purpose of modulating pain, promoting relaxation,  increasing ROM, reducing/eliminating soft tissue swelling/inflammation/restriction, improving soft tissue extensibility and allowing for proper ROM for normal function with self care, reaching, carrying, lifting, house/yardwork, driving/computer work    Modalities:  CP x 10'    Charges:  Timed Code Treatment Minutes: 25'   Total Treatment Minutes: 28'     [x] EVAL (LOW) 00946 (typically 20 minutes face-to-face)  [] EVAL (MOD) 21504 (typically 30 minutes face-to-face)  [] EVAL (HIGH) 97527 (typically 45 minutes face-to-face)  [] RE-EVAL     [x] XG(63576) x  1   [] Ionto  [] NMR (30822) x      [] Vaso  [x] Manual (65080) x  1    [] Mech Traction  [] ES (unattended):   [x] Other: CP     GOALS:  Short Term Goals:  To be achieved in: 2 weeks  1. Independent in HEP and progression per patient tolerance, in order to prevent re-injury. 2. Patient will have a decrease in pain to facilitate improvement in movement, function, and ADLs as indicated by Functional Deficits.     Long Term Goals: To be achieved in: 10 weeks  1. Disability index score of 10% or less for the Quick Dash to assist with reaching prior level of function. 2. Patient will demonstrate increased AROM to equal the opposite side bilaterally to allow for proper joint functioning as indicated by patients Functional Deficits. 3. Patient will demonstrate an increase in strength of R UE = L UE to allow for proper functional mobility as indicated by patients Functional Deficits. 4. Patient will return to all transfers, work activities, and functional activities without increased symptoms or restriction. 5. Patient will have 0/10 pain with ADL's.  6. Patient stated goal: to have complete recovery and get back to doing everything done before sx.                                      Goals that are underlined signify the goal has been accomplished. Progression Towards Functional goals:  [] Patient is progressing as expected towards functional goals listed. [] Progression is slowed due to complexities listed. [] Progression has been slowed due to co-morbidities. [x] Plan just implemented, too soon to assess goals progression  [] Other:     ASSESSMENT:  Pt seen for minimal PROM to access end feel and Range and re-education on HEP with demo/return performance to assure proper performance. PROM Flex pt was performing incorrectly but able to correct with verbal cueing.     Treatment/Activity Tolerance:   [x] Patient tolerated treatment well [] Patient limited by fatique  [] Patient limited by pain  [] Patient limited by other medical complications  [] Other:     Prognosis: [x] Good [] Fair  [] Poor    Patient Requires Follow-up: [x] Yes  [] No    PLAN: See eval  [x] Continue per

## 2018-09-17 DIAGNOSIS — M67.919 ROTATOR CUFF DISORDER, UNSPECIFIED LATERALITY: ICD-10-CM

## 2018-09-18 RX ORDER — OXYCODONE HYDROCHLORIDE AND ACETAMINOPHEN 5; 325 MG/1; MG/1
1 TABLET ORAL EVERY 6 HOURS PRN
Qty: 28 TABLET | Refills: 0 | Status: SHIPPED | OUTPATIENT
Start: 2018-09-18 | End: 2018-09-25

## 2018-09-18 RX ORDER — HYDROCODONE BITARTRATE AND ACETAMINOPHEN 5; 325 MG/1; MG/1
1 TABLET ORAL EVERY 6 HOURS PRN
Qty: 40 TABLET | Refills: 0 | OUTPATIENT
Start: 2018-09-18

## 2018-09-25 ENCOUNTER — HOSPITAL ENCOUNTER (OUTPATIENT)
Dept: PHYSICAL THERAPY | Age: 68
Setting detail: THERAPIES SERIES
Discharge: HOME | End: 2018-09-25
Payer: MEDICARE

## 2018-09-25 ENCOUNTER — OFFICE VISIT (OUTPATIENT)
Dept: ORTHOPEDIC SURGERY | Age: 68
End: 2018-09-25

## 2018-09-25 DIAGNOSIS — Z98.890 STATUS POST RIGHT ROTATOR CUFF REPAIR: Primary | ICD-10-CM

## 2018-09-25 PROCEDURE — 97110 THERAPEUTIC EXERCISES: CPT

## 2018-09-25 PROCEDURE — 97140 MANUAL THERAPY 1/> REGIONS: CPT

## 2018-09-25 PROCEDURE — 99024 POSTOP FOLLOW-UP VISIT: CPT | Performed by: ORTHOPAEDIC SURGERY

## 2018-09-25 NOTE — FLOWSHEET NOTE
[address] [place] [address] [facility]    Physical Therapy Daily Treatment Note  Date:  2018    Patient Name:  Radah Nevarez    :  1950  MRN: [id number]  Medical/Treatment Diagnosis Information:   Diagnosis:  Massive RTC Repair, Biceps Tenodesis, SAD 8/15/18  Treatment Diagnosis: C44.283   Insurance/Certification information: PT Insurance Information: Medicare/Medicaid    Physician Information:   Referring Practitioner: Gary Marley of care signed (Y/N):     Date of Patient follow up with Physician:  2018    G-Code (if applicable):      Date G-Code Applied:  2018       Progress Note: [x]  Yes  []  No      Latex Allergy:  [x]NO      []YES     Preferred Language for Healthcare:   [x]English       []other:     Visit # Insurance Allowable   3 Med Nec     Pain level:  8/10     SUBJECTIVE:  Patient reports hurting but he hurts everywhere he says so he \"deals\" with it.  Pt anxious to get out of sling    OBJECTIVE: See eval  Observation:   Test measurements:    Patient educated on following:    RESTRICTIONS/PRECAUTIONS: Massive RTC repair    Exercises/Interventions:   Therapeutic Ex Wt/Sets/Reps/Hold Notes   Vickie          Shrugs 30 x    Shoulder Pinches  30 x    PROM Shoulder flex 10 x 3    PROM Shoulder ER 10 x 3    Ball Squeezes 30 x                                                           Manual     Gr I-II mobs for tissue reactivity 5'    PROM-all planes 15' Flex only, pt not tolerate others severe increase in pain   GR III-IV mobs for arthrokinematics     Cervical/long axis distraction     Thoracic PA mobs     PNF for strengthening     PNF for agonist/antagonist inhibition          Pt education 10' Pt ed with precautions and HEP and proper performance     Therapeutic Exercise and NMR EXR  [x] (01297) Provided verbal/tactile cueing for activities related to strengthening, flexibility, endurance, ROM  for improvements in scapular, scapulothoracic and UE control with self care, reaching, carrying, lifting, house/yardwork, driving/computer work. [x] (58695) Provided verbal/tactile cueing for activities related to improving balance, coordination, kinesthetic sense, posture, motor skill, proprioception  to assist with  scapular, scapulothoracic and UE control with self care, reaching, carrying, lifting, house/yardwork, driving/computer work. Home Exercise Program:    [x] (30775) Reviewed/Progressed HEP activities related to strengthening, flexibility, endurance, ROM of scapular, scapulothoracic and UE control with self care, reaching, carrying, lifting, house/yardwork, driving/computer work  [x] (97400) Reviewed/Progressed HEP activities related to improving balance, coordination, kinesthetic sense, posture, motor skill, proprioception of scapular, scapulothoracic and UE control with self care, reaching, carrying, lifting, house/yardwork, driving/computer work      Manual Treatments:  PROM / STM / Oscillations-Mobs:  G-I, II, III, IV (PA's, Inf., Post.)  [x] (12444) Provided manual therapy to mobilize soft tissue/joints of cervical/CT, scapular GHJ and UE for the purpose of modulating pain, promoting relaxation,  increasing ROM, reducing/eliminating soft tissue swelling/inflammation/restriction, improving soft tissue extensibility and allowing for proper ROM for normal function with self care, reaching, carrying, lifting, house/yardwork, driving/computer work    Modalities:  CP x 10'    Charges:  Timed Code Treatment Minutes: 30'   Total Treatment Minutes: 40'     [] EVAL (LOW) 01624 (typically 20 minutes face-to-face)  [] EVAL (MOD) 17200 (typically 30 minutes face-to-face)  [] EVAL (HIGH) 16291 (typically 45 minutes face-to-face)  [] RE-EVAL     [x] CY(64405) x  1   [] Ionto  [] NMR (69978) x      [] Vaso  [x] Manual (96094) x  1    [] Mech Traction  [] ES (unattended):   [x] Other: CP     GOALS:  Short Term Goals: To be achieved in: 2 weeks  1.  Independent in HEP and progression per patient tolerance, in order to prevent re-injury. 2. Patient will have a decrease in pain to facilitate improvement in movement, function, and ADLs as indicated by Functional Deficits.     Long Term Goals: To be achieved in: 10 weeks  1. Disability index score of 10% or less for the Quick Dash to assist with reaching prior level of function. 2. Patient will demonstrate increased AROM to equal the opposite side bilaterally to allow for proper joint functioning as indicated by patients Functional Deficits. 3. Patient will demonstrate an increase in strength of R UE = L UE to allow for proper functional mobility as indicated by patients Functional Deficits. 4. Patient will return to all transfers, work activities, and functional activities without increased symptoms or restriction. 5. Patient will have 0/10 pain with ADL's.  6. Patient stated goal: to have complete recovery and get back to doing everything done before sx.                                      Goals that are underlined signify the goal has been accomplished. Progression Towards Functional goals:  [] Patient is progressing as expected towards functional goals listed. [] Progression is slowed due to complexities listed. [] Progression has been slowed due to co-morbidities. [x] Plan just implemented, too soon to assess goals progression  [] Other:     ASSESSMENT:  Pt seen for minimal PROM to access end feel and Range and re-education on HEP with demo/return performance to assure proper performance. PROM Flex pt was performing incorrectly but able to correct with verbal cueing.     Treatment/Activity Tolerance:   [x] Patient tolerated treatment well [] Patient limited by fatique  [] Patient limited by pain  [] Patient limited by other medical complications  [] Other:     Prognosis: [x] Good [] Fair  [] Poor    Patient Requires Follow-up: [x] Yes  [] No    PLAN: See nataliia  [x] Continue per plan of care [] Pat Ventura current plan (see comments)  [] Plan of care initiated [] Hold pending MD visit [] Discharge    Electronically signed by: Kenyatta Echols PT

## 2018-10-02 ENCOUNTER — HOSPITAL ENCOUNTER (OUTPATIENT)
Dept: PHYSICAL THERAPY | Age: 68
Setting detail: THERAPIES SERIES
Discharge: HOME | End: 2018-10-02
Payer: MEDICARE

## 2018-10-02 PROCEDURE — 97110 THERAPEUTIC EXERCISES: CPT

## 2018-10-02 PROCEDURE — 97140 MANUAL THERAPY 1/> REGIONS: CPT

## 2018-10-08 ENCOUNTER — HOSPITAL ENCOUNTER (OUTPATIENT)
Dept: PHYSICAL THERAPY | Age: 68
Setting detail: THERAPIES SERIES
Discharge: HOME | End: 2018-10-08
Payer: MEDICARE

## 2018-10-08 PROCEDURE — 97140 MANUAL THERAPY 1/> REGIONS: CPT | Performed by: PHYSICAL THERAPY ASSISTANT

## 2018-10-08 PROCEDURE — 97110 THERAPEUTIC EXERCISES: CPT | Performed by: PHYSICAL THERAPY ASSISTANT

## 2018-10-08 PROCEDURE — 97112 NEUROMUSCULAR REEDUCATION: CPT | Performed by: PHYSICAL THERAPY ASSISTANT

## 2018-10-08 NOTE — FLOWSHEET NOTE
Term Goals: To be achieved in: 2 weeks  1. Independent in HEP and progression per patient tolerance, in order to prevent re-injury. 2. Patient will have a decrease in pain to facilitate improvement in movement, function, and ADLs as indicated by Functional Deficits.     Long Term Goals: To be achieved in: 10 weeks  1. Disability index score of 10% or less for the Quick Dash to assist with reaching prior level of function. 2. Patient will demonstrate increased AROM to equal the opposite side bilaterally to allow for proper joint functioning as indicated by patients Functional Deficits. 3. Patient will demonstrate an increase in strength of R UE = L UE to allow for proper functional mobility as indicated by patients Functional Deficits. 4. Patient will return to all transfers, work activities, and functional activities without increased symptoms or restriction. 5. Patient will have 0/10 pain with ADL's.  6. Patient stated goal: to have complete recovery and get back to doing everything done before sx.                                      Goals that are underlined signify the goal has been accomplished. Progression Towards Functional goals:  [x] Patient is progressing as expected towards functional goals listed. [] Progression is slowed due to complexities listed. [] Progression has been slowed due to co-morbidities. [] Plan just implemented, too soon to assess goals progression  [] Other:     ASSESSMENT:  Tolerated increases without complaint.       Treatment/Activity Tolerance:   [x] Patient tolerated treatment well [] Patient limited by fatique  [] Patient limited by pain  [] Patient limited by other medical complications  [] Other:     Prognosis: [x] Good [] Fair  [] Poor    Patient Requires Follow-up: [x] Yes  [] No    PLAN: See eval  [x] Continue per plan of care [] Alter current plan (see comments)  [] Plan of care initiated [] Hold pending MD visit [] Discharge    Electronically signed by:

## 2018-10-10 ENCOUNTER — HOSPITAL ENCOUNTER (OUTPATIENT)
Dept: PHYSICAL THERAPY | Age: 68
Setting detail: THERAPIES SERIES
Discharge: HOME | End: 2018-10-10
Payer: MEDICARE

## 2018-10-10 PROCEDURE — 97140 MANUAL THERAPY 1/> REGIONS: CPT | Performed by: PHYSICAL THERAPY ASSISTANT

## 2018-10-10 PROCEDURE — 97110 THERAPEUTIC EXERCISES: CPT | Performed by: PHYSICAL THERAPY ASSISTANT

## 2018-10-10 PROCEDURE — 97112 NEUROMUSCULAR REEDUCATION: CPT | Performed by: PHYSICAL THERAPY ASSISTANT

## 2018-10-16 ENCOUNTER — HOSPITAL ENCOUNTER (OUTPATIENT)
Dept: PHYSICAL THERAPY | Age: 68
Setting detail: THERAPIES SERIES
Discharge: HOME | End: 2018-10-16
Payer: MEDICARE

## 2018-10-16 PROCEDURE — 97140 MANUAL THERAPY 1/> REGIONS: CPT | Performed by: PHYSICAL THERAPIST

## 2018-10-16 PROCEDURE — 97112 NEUROMUSCULAR REEDUCATION: CPT | Performed by: PHYSICAL THERAPIST

## 2018-10-16 PROCEDURE — 97110 THERAPEUTIC EXERCISES: CPT | Performed by: PHYSICAL THERAPIST

## 2018-10-18 ENCOUNTER — HOSPITAL ENCOUNTER (OUTPATIENT)
Dept: PHYSICAL THERAPY | Age: 68
Setting detail: THERAPIES SERIES
Discharge: HOME | End: 2018-10-18
Payer: MEDICARE

## 2018-10-18 PROCEDURE — 97112 NEUROMUSCULAR REEDUCATION: CPT

## 2018-10-18 PROCEDURE — 97140 MANUAL THERAPY 1/> REGIONS: CPT

## 2018-10-18 PROCEDURE — 97110 THERAPEUTIC EXERCISES: CPT

## 2018-10-23 ENCOUNTER — HOSPITAL ENCOUNTER (OUTPATIENT)
Dept: PHYSICAL THERAPY | Age: 68
Setting detail: THERAPIES SERIES
Discharge: HOME | End: 2018-10-23
Payer: MEDICARE

## 2018-10-23 PROCEDURE — 97112 NEUROMUSCULAR REEDUCATION: CPT | Performed by: PHYSICAL THERAPY ASSISTANT

## 2018-10-23 PROCEDURE — 97110 THERAPEUTIC EXERCISES: CPT | Performed by: PHYSICAL THERAPY ASSISTANT

## 2018-10-23 PROCEDURE — 97140 MANUAL THERAPY 1/> REGIONS: CPT | Performed by: PHYSICAL THERAPY ASSISTANT

## 2018-10-23 NOTE — FLOWSHEET NOTE
(typically 45 minutes face-to-face)  [] RE-EVAL     [x] KB(60674) x  2   [] Ionto  [x] NMR (83598) x  1   [] Vaso  [x] Manual (29150) x  1    [] Mech Traction  [] ES (unattended):   [x] Other: CP     GOALS:  Short Term Goals: To be achieved in: 2 weeks  1. Independent in HEP and progression per patient tolerance, in order to prevent re-injury. 2. Patient will have a decrease in pain to facilitate improvement in movement, function, and ADLs as indicated by Functional Deficits.     Long Term Goals: To be achieved in: 10 weeks  1. Disability index score of 10% or less for the Quick Dash to assist with reaching prior level of function. 2. Patient will demonstrate increased AROM to equal the opposite side bilaterally to allow for proper joint functioning as indicated by patients Functional Deficits. 3. Patient will demonstrate an increase in strength of R UE = L UE to allow for proper functional mobility as indicated by patients Functional Deficits. 4. Patient will return to all transfers, work activities, and functional activities without increased symptoms or restriction. 5. Patient will have 0/10 pain with ADL's.  6. Patient stated goal: to have complete recovery and get back to doing everything done before sx.                                      Goals that are underlined signify the goal has been accomplished. Progression Towards Functional goals:  [x] Patient is progressing as expected towards functional goals listed. [] Progression is slowed due to complexities listed. [] Progression has been slowed due to co-morbidities. [] Plan just implemented, too soon to assess goals progression  [] Other:     ASSESSMENT:  Increased pain with ER ex, poor ER control. Focused on scap stab ex to relieve some symptoms.       Treatment/Activity Tolerance:   [x] Patient tolerated treatment well [] Patient limited by fatique  [] Patient limited by pain  [] Patient limited by other medical complications  [] Other:

## 2018-10-25 ENCOUNTER — HOSPITAL ENCOUNTER (OUTPATIENT)
Dept: PHYSICAL THERAPY | Age: 68
Setting detail: THERAPIES SERIES
Discharge: HOME | End: 2018-10-25
Payer: MEDICARE

## 2018-10-25 PROCEDURE — 97110 THERAPEUTIC EXERCISES: CPT | Performed by: PHYSICAL THERAPY ASSISTANT

## 2018-10-25 PROCEDURE — G8984 CARRY CURRENT STATUS: HCPCS | Performed by: PHYSICAL THERAPY ASSISTANT

## 2018-10-25 PROCEDURE — 97140 MANUAL THERAPY 1/> REGIONS: CPT | Performed by: PHYSICAL THERAPY ASSISTANT

## 2018-10-25 PROCEDURE — G8985 CARRY GOAL STATUS: HCPCS | Performed by: PHYSICAL THERAPY ASSISTANT

## 2018-10-25 PROCEDURE — 97112 NEUROMUSCULAR REEDUCATION: CPT | Performed by: PHYSICAL THERAPY ASSISTANT

## 2018-10-25 NOTE — PLAN OF CARE
[address] ([facility])     Physical Therapy Re-Certification Plan of Care    Dear  Dr. John Michel,    We had the pleasure of treating the following patient for physical therapy services at [address] [place] [address]. A summary of our findings can be found in the updated assessment below. This includes our plan of care. If you have any questions or concerns regarding these findings, please do not hesitate to contact me at the office phone number checked above. Thank you for the referral.     Physician Signature:________________________________Date:__________________  By signing above (or electronic signature), therapists plan is approved by physician      Overall Response to Treatment:   [x]Patient is responding well to treatment and improvement is noted with regards to goals   []Patient should continue to improve in reasonable time if they continue HEP   []Patient has plateaued and is no longer responding to skilled PT intervention    []Patient is getting worse and would benefit from return to referring MD   []Patient unable to adhere to initial POC   []Other:   Date:  10/25/2018    Patient Name:  Shaina Saenz    :  1950  MRN: [id number]  Medical/Treatment Diagnosis Information:   Diagnosis:  Massive Right RTC Repair, Biceps Tenodesis, SAD 8/15/18  Treatment Diagnosis: K21.210   Insurance/Certification information: PT Insurance Information: Medicare/Medicaid    Physician Information:   Referring Practitioner: Jesse Anderson of care signed (Y/N):     Date of Patient follow up with Physician:      G-Code (if applicable):      Date G-Code Applied:  10/25/2018  PT G-Codes  Functional Assessment Tool Used: Alysha Babin  Score: 54%  Functional Limitation: Carrying, moving and handling objects  Carrying, Moving and Handling Objects Current Status ():  At least 40 percent but less than 60 percent impaired, limited or restricted  Carrying, Moving and Handling Objects Goal Status (): At least 1 percent but less than 20 percent impaired, limited or restricted    Progress Note: [x]  Yes  []  No      Latex Allergy:  [x]NO      []YES     Preferred Language for Healthcare:   [x]English       []other:     Visit # Insurance Allowable   10 Med Nec     Pain level:  7/10     SUBJECTIVE:  Patient is 9 weeks post op on 10/17/2018. \"I am here\"    OBJECTIVE:   Observation:   Test measurements:      Flexion: 155 °          ER: 45 °   Patient educated on following:    RESTRICTIONS/PRECAUTIONS: Massive RTC repair    Exercises/Interventions:   Therapeutic Ex Wt/Sets/Reps/Hold Notes   Pulley 5'         Ball on [employer] with assist  x20         Sub max isos 10x10\" flex/ext/ER    Bicep Curls  x10 3 way     Shrugs 30 x    Shoulder Pinches  30 x       TB rows/ext  TB ER/ER [id number] ea  x15 eaRed  Red/Yellow      LLLD ER [id number] 1 min      Table Slides Flex/Scap  10x10\" ea    Table ER stretch 10 x 10\"                   Cane supine punch x30    Cane Press  x30    Cane flexion  Cane ER  10x10\"  10x10\"    SL ER - assist on concentric - hold 3\" and ecc lower x15    Prone ext, row + scap stab 2 x 10              Manual     Gr I-II mobs for tissue reactivity 5'    PROM-all planes 15' Increased pain at end range - ER tight   Seated inferior mobs x3'    GR III-IV mobs for arthrokinematics     Cervical/long axis distraction     Thoracic PA mobs     PNF for strengthening     PNF for agonist/antagonist inhibition          Pt education 5' Pt ed with precautions and HEP and proper performance     Therapeutic Exercise and NMR EXR  [x] (84769) Provided verbal/tactile cueing for activities related to strengthening, flexibility, endurance, ROM  for improvements in scapular, scapulothoracic and UE control with self care, reaching, carrying, lifting, house/yardwork, driving/computer work.     [x] (83050) Provided verbal/tactile cueing for activities related to improving balance,

## 2018-10-30 ENCOUNTER — HOSPITAL ENCOUNTER (OUTPATIENT)
Dept: PHYSICAL THERAPY | Age: 68
Setting detail: THERAPIES SERIES
Discharge: HOME | End: 2018-10-30
Payer: MEDICARE

## 2018-10-30 PROCEDURE — 97140 MANUAL THERAPY 1/> REGIONS: CPT | Performed by: PHYSICAL THERAPY ASSISTANT

## 2018-10-30 PROCEDURE — 97112 NEUROMUSCULAR REEDUCATION: CPT | Performed by: PHYSICAL THERAPY ASSISTANT

## 2018-10-30 PROCEDURE — 97110 THERAPEUTIC EXERCISES: CPT | Performed by: PHYSICAL THERAPY ASSISTANT

## 2018-10-30 NOTE — FLOWSHEET NOTE
[address], [facility] ([place])     Daily Treatment Note  Date:  10/30/2018    Patient Name:  Watson Gtz    :  1950  MRN: [id number]  Medical/Treatment Diagnosis Information:   Diagnosis:  Massive Right RTC Repair, Biceps Tenodesis, SAD 8/15/18  Treatment Diagnosis: Q43.501   Insurance/Certification information: PT Insurance Information: Medicare/Medicaid    Physician Information:   Referring Practitioner: Marcellus Plan of care signed (Y/N):     Date of Patient follow up with Physician:      G-Code (if applicable):      Date G-Code Applied:  10/30/2018       Progress Note: [x]  Yes  []  No      Latex Allergy:  [x]NO      []YES     Preferred Language for Healthcare:   [x]English       []other:     Visit # Insurance Allowable   11 Med Nec     Pain level:  7/10     SUBJECTIVE:  Patient is 11 weeks post op on 10/31/2018. Patient states that pain is the same and he denies any additional activity but comments \"I am doing what I can do\" upon questioning of overuse. Patient has been instructed on activity modification.     OBJECTIVE:   Observation:   Test measurements:      Flexion: 155 °          ER: 45 °   Patient educated on following:    RESTRICTIONS/PRECAUTIONS: Massive RTC repair    Exercises/Interventions:   Therapeutic Ex Wt/Sets/Reps/Hold Notes   Pulley 5'         Ball on [employer] with assist  x20         Sub max isos 10x10\" flex/ext/ER    Bicep Curls              TB rows/ext  TB ER/ER [id number] ea  x15 eaGTB  Red/Yellow      LLLD ER [id number] 1 min      Table Slides Flex/Scap  10x10\" ea    Table ER stretch 10 x 10\"              AAROM seated cane flexion x20              Sleeper stretch 10\" x15    Cane press with ecc lower to waist x15    Cane supine punch 2# x30    Cane flexion  Cane ER  x15  x15    SL ER - assist on concentric - hold 3\" and ecc lower    Prone ext, row + scap stab              Manual     Gr I-II mobs for tissue reactivity 5' PROM-all planes 15' Increased pain at end range - ER tight   Seated inferior mobs x3'    GR III-IV mobs for arthrokinematics     Cervical/long axis distraction     Thoracic PA mobs     PNF for strengthening     PNF for agonist/antagonist inhibition          Pt education 5' Pt ed with precautions and HEP and proper performance     Therapeutic Exercise and NMR EXR  [x] (99717) Provided verbal/tactile cueing for activities related to strengthening, flexibility, endurance, ROM  for improvements in scapular, scapulothoracic and UE control with self care, reaching, carrying, lifting, house/yardwork, driving/computer work. [x] (36968) Provided verbal/tactile cueing for activities related to improving balance, coordination, kinesthetic sense, posture, motor skill, proprioception  to assist with  scapular, scapulothoracic and UE control with self care, reaching, carrying, lifting, house/yardwork, driving/computer work.     Home Exercise Program:    [x] (71963) Reviewed/Progressed HEP activities related to strengthening, flexibility, endurance, ROM of scapular, scapulothoracic and UE control with self care, reaching, carrying, lifting, house/yardwork, driving/computer work  [x] (70362) Reviewed/Progressed HEP activities related to improving balance, coordination, kinesthetic sense, posture, motor skill, proprioception of scapular, scapulothoracic and UE control with self care, reaching, carrying, lifting, house/yardwork, driving/computer work      Manual Treatments:  PROM / STM / Oscillations-Mobs:  G-I, II, III, IV (PA's, Inf., Post.)  [x] (65026) Provided manual therapy to mobilize soft tissue/joints of cervical/CT, scapular GHJ and UE for the purpose of modulating pain, promoting relaxation,  increasing ROM, reducing/eliminating soft tissue swelling/inflammation/restriction, improving soft tissue extensibility and allowing for proper ROM for normal function with self care, reaching, carrying, lifting, house/yardwork,

## 2018-11-01 ENCOUNTER — HOSPITAL ENCOUNTER (OUTPATIENT)
Dept: PHYSICAL THERAPY | Age: 68
Setting detail: THERAPIES SERIES
Discharge: HOME | End: 2018-11-01
Payer: MEDICARE

## 2018-11-01 PROCEDURE — 97140 MANUAL THERAPY 1/> REGIONS: CPT | Performed by: PHYSICAL THERAPY ASSISTANT

## 2018-11-01 PROCEDURE — 97110 THERAPEUTIC EXERCISES: CPT | Performed by: PHYSICAL THERAPY ASSISTANT

## 2018-11-01 NOTE — FLOWSHEET NOTE
minutes face-to-face)  [] EVAL (HIGH) 51760 (typically 45 minutes face-to-face)  [] RE-EVAL     [x] CO(35660) x  2   [] Ionto  [x] NMR (72991) x  1   [] Vaso  [x] Manual (66315) x  1    [] Mech Traction  [] ES (unattended):   [] Other: CP     GOALS:  Short Term Goals: To be achieved in: 2 weeks  1. Independent in HEP and progression per patient tolerance, in order to prevent re-injury. 2. Patient will have a decrease in pain to facilitate improvement in movement, function, and ADLs as indicated by Functional Deficits.     Long Term Goals: To be achieved in: 10 weeks  1. Disability index score of 10% or less for the Quick Dash to assist with reaching prior level of function. 2. Patient will demonstrate increased AROM to equal the opposite side bilaterally to allow for proper joint functioning as indicated by patients Functional Deficits. 3. Patient will demonstrate an increase in strength of R UE = L UE to allow for proper functional mobility as indicated by patients Functional Deficits. 4. Patient will return to all transfers, work activities, and functional activities without increased symptoms or restriction. 5. Patient will have 0/10 pain with ADL's.  6. Patient stated goal: to have complete recovery and get back to doing everything done before sx.                                      Goals that are underlined signify the goal has been accomplished. Progression Towards Functional goals:  [x] Patient is progressing as expected towards functional goals listed. [] Progression is slowed due to complexities listed. [] Progression has been slowed due to co-morbidities. [] Plan just implemented, too soon to assess goals progression  [] Other:     ASSESSMENT:  Increased pain with ER ex, poor ER control. Focused on scap stab ex to relieve some symptoms.       Treatment/Activity Tolerance:   [x] Patient tolerated treatment well [] Patient limited by fatique  [] Patient limited by pain  [] Patient

## 2018-11-06 ENCOUNTER — HOSPITAL ENCOUNTER (OUTPATIENT)
Dept: PHYSICAL THERAPY | Age: 68
Setting detail: THERAPIES SERIES
Discharge: HOME | End: 2018-11-06
Payer: MEDICARE

## 2018-11-06 PROCEDURE — 97140 MANUAL THERAPY 1/> REGIONS: CPT

## 2018-11-06 PROCEDURE — 97110 THERAPEUTIC EXERCISES: CPT

## 2018-11-06 NOTE — FLOWSHEET NOTE
agonist/antagonist inhibition          Pt education 5' Pt ed with precautions and HEP and proper performance     Therapeutic Exercise and NMR EXR  [x] (61202) Provided verbal/tactile cueing for activities related to strengthening, flexibility, endurance, ROM  for improvements in scapular, scapulothoracic and UE control with self care, reaching, carrying, lifting, house/yardwork, driving/computer work. [x] (55398) Provided verbal/tactile cueing for activities related to improving balance, coordination, kinesthetic sense, posture, motor skill, proprioception  to assist with  scapular, scapulothoracic and UE control with self care, reaching, carrying, lifting, house/yardwork, driving/computer work.     Home Exercise Program:    [x] (89247) Reviewed/Progressed HEP activities related to strengthening, flexibility, endurance, ROM of scapular, scapulothoracic and UE control with self care, reaching, carrying, lifting, house/yardwork, driving/computer work  [x] (18694) Reviewed/Progressed HEP activities related to improving balance, coordination, kinesthetic sense, posture, motor skill, proprioception of scapular, scapulothoracic and UE control with self care, reaching, carrying, lifting, house/yardwork, driving/computer work      Manual Treatments:  PROM / STM / Oscillations-Mobs:  G-I, II, III, IV (PA's, Inf., Post.)  [x] (34847) Provided manual therapy to mobilize soft tissue/joints of cervical/CT, scapular GHJ and UE for the purpose of modulating pain, promoting relaxation,  increasing ROM, reducing/eliminating soft tissue swelling/inflammation/restriction, improving soft tissue extensibility and allowing for proper ROM for normal function with self care, reaching, carrying, lifting, house/yardwork, driving/computer work    Modalities:  CP x 8'    Charges:  Timed Code Treatment Minutes: 45'   Total Treatment Minutes: 53     [] EVAL (LOW) 57312 (typically 20 minutes face-to-face)  [] EVAL (MOD) 38857 (typically 30

## 2018-11-08 ENCOUNTER — HOSPITAL ENCOUNTER (OUTPATIENT)
Dept: PHYSICAL THERAPY | Age: 68
Setting detail: THERAPIES SERIES
Discharge: HOME | End: 2018-11-08
Payer: MEDICARE

## 2018-11-08 PROCEDURE — 97140 MANUAL THERAPY 1/> REGIONS: CPT | Performed by: PHYSICAL THERAPY ASSISTANT

## 2018-11-08 PROCEDURE — 97110 THERAPEUTIC EXERCISES: CPT | Performed by: PHYSICAL THERAPY ASSISTANT

## 2018-11-08 PROCEDURE — 97112 NEUROMUSCULAR REEDUCATION: CPT | Performed by: PHYSICAL THERAPY ASSISTANT

## 2018-11-08 NOTE — FLOWSHEET NOTE
exercises for form. Pt reported no increased sx at end of session, just soreness.     Treatment/Activity Tolerance:   [x] Patient tolerated treatment well [] Patient limited by fatique  [] Patient limited by pain  [] Patient limited by other medical complications  [] Other:     Prognosis: [x] Good [] Fair  [] Poor    Patient Requires Follow-up: [x] Yes  [] No    PLAN: See eval  [x] Continue per plan of care [] Alter current plan (see comments)  [] Plan of care initiated [] Hold pending MD visit [] Discharge    Electronically signed by: Bud Jones , ROMERO

## 2018-11-13 ENCOUNTER — HOSPITAL ENCOUNTER (OUTPATIENT)
Dept: PHYSICAL THERAPY | Age: 68
Setting detail: THERAPIES SERIES
Discharge: HOME | End: 2018-11-13
Payer: MEDICARE

## 2018-11-13 PROCEDURE — 97140 MANUAL THERAPY 1/> REGIONS: CPT | Performed by: PHYSICAL THERAPY ASSISTANT

## 2018-11-13 PROCEDURE — 97110 THERAPEUTIC EXERCISES: CPT | Performed by: PHYSICAL THERAPY ASSISTANT

## 2018-11-13 PROCEDURE — 97112 NEUROMUSCULAR REEDUCATION: CPT | Performed by: PHYSICAL THERAPY ASSISTANT

## 2018-11-13 NOTE — FLOWSHEET NOTE
Bush[facility] Hart, Stephanie     Daily Treatment Note  Date:  2018    Patient Name:  Li Thornton    :  1950  MRN: [id number]  Medical/Treatment Diagnosis Information:   Diagnosis:  Massive Right RTC Repair, Biceps Tenodesis, SAD 8/15/18  Treatment Diagnosis: W28.420   Insurance/Certification information: PT Insurance Information: Medicare/Medicaid    Physician Information:   Referring Practitioner: Roselyn Cobb of care signed (Y/N):     Date of Patient follow up with Physician:      G-Code (if applicable):      Date G-Code Applied:  2018       Progress Note: [x]  Yes  []  No      Latex Allergy:  [x]NO      []YES     Preferred Language for Healthcare:   []English       []other:     Visit # Insurance Allowable   15 Med Nec     Pain level:  7/10     SUBJECTIVE:  Doing OK - no new complaints. States he continues to have a constant soreness but reports it is mostly when he is laying down or trying to lay on that shoulder.      OBJECTIVE:   Observation: Patient requires cueing to slow down and pay attention to form with all exercises   Test measurements:      Flexion: 160 °          ER: 45 °   Patient educated on following:    RESTRICTIONS/PRECAUTIONS: Massive RTC repair    Exercises/Interventions:   Therapeutic Ex Wt/Sets/Reps/Hold Notes   Pulley 5'         Ball on Wall 4 way x20 ea    Wall Walk with assist  x20         Sub max isos    Bicep Curls      Wall Push Up x20       TB rows/ext  TB ER/ER [id number] ea  x20 eaGTB  Red/Yellow      LLLD ER [id number] 1 min      Table Slides Flex/Scap      Table ER stretch                             Sleeper stretch 10\" x15    Cane press with ecc lower to waist x20    Cane supine punch 3# x30    Cane flexion  Cane ER  3# x20  x15    SL ER -SL ABD (short lever) 1# 2x10    1# 2x10    Prone ext, row + scap stab 1# 2 x 10   Supine flexion / Supine punch  X20 / x20         Manual     Gr I-II mobs for tissue reactivity 5'

## 2018-11-15 ENCOUNTER — HOSPITAL ENCOUNTER (OUTPATIENT)
Dept: PHYSICAL THERAPY | Age: 68
Setting detail: THERAPIES SERIES
Discharge: HOME | End: 2018-11-15
Payer: MEDICARE

## 2018-11-15 PROCEDURE — 97112 NEUROMUSCULAR REEDUCATION: CPT | Performed by: PHYSICAL THERAPY ASSISTANT

## 2018-11-15 PROCEDURE — 97140 MANUAL THERAPY 1/> REGIONS: CPT | Performed by: PHYSICAL THERAPY ASSISTANT

## 2018-11-15 PROCEDURE — 97110 THERAPEUTIC EXERCISES: CPT | Performed by: PHYSICAL THERAPY ASSISTANT

## 2018-11-15 NOTE — FLOWSHEET NOTE
[address] [facility] and [address] [facility] ([facility])     Daily Treatment Note  Date:  11/15/2018    Patient Name:  Marnie Jefferson    :  1950  MRN: [id number]  Medical/Treatment Diagnosis Information:   Diagnosis:  Massive Right RTC Repair, Biceps Tenodesis, SAD 8/15/18  Treatment Diagnosis: W97.570   Insurance/Certification information: PT Insurance Information: Medicare/Medicaid    Physician Information:   Referring Practitioner: Mateus Forman of care signed (Y/N):     Date of Patient follow up with Physician:      G-Code (if applicable):      Date G-Code Applied:  11/15/2018       Progress Note: [x]  Yes  []  No      Latex Allergy:  [x]NO      []YES     Preferred Language for Healthcare:   []English       []other:     Visit # Insurance Allowable   16 Med Nec     Pain level:  7/10     SUBJECTIVE:    OBJECTIVE:   Observation: Patient requires cueing to slow down and pay attention to form with all exercises   Test measurements:      Flexion: 160 °          ER: 45 °   Patient educated on following:    RESTRICTIONS/PRECAUTIONS: Massive RTC repair    Exercises/Interventions:   Therapeutic Ex Wt/Sets/Reps/Hold Notes   Pulley 5'         Ball on Wall 4 way x20 ea    Wall Walk with assist  x20         Sub max isos    Bicep Curls      Wall Push Up x20       PRE Flexion / scaption X20 ea          TB rows/ext  TB ER/ER [id number] ea  x20 eaGTB  Red/Yellow      LLLD ER [id number] 1 min      Table Slides Flex/Scap      Table ER stretch                             Sleeper stretch 10\" x15    Cane press with ecc lower to waist x20    Cane supine punch 3# x30    Cane flexion  Cane ER  3# x20  x15    SL ER -SL ABD (short lever) 1# 2x10    1# 2x10    Prone ext, row + scap stab 1# 2 x 10   Supine flexion / Supine punch  X20 / x20         Manual     Gr I-II mobs for tissue reactivity 5'    PROM-all planes 15'    Seated inferior mobs x3'    GR III-IV mobs for arthrokinematics     Cervical/long axis distraction

## 2018-11-20 ENCOUNTER — HOSPITAL ENCOUNTER (OUTPATIENT)
Dept: PHYSICAL THERAPY | Age: 68
Setting detail: THERAPIES SERIES
Discharge: HOME | End: 2018-11-20
Payer: MEDICARE

## 2018-11-20 ENCOUNTER — OFFICE VISIT (OUTPATIENT)
Dept: ORTHOPEDIC SURGERY | Age: 68
End: 2018-11-20
Payer: MEDICARE

## 2018-11-20 VITALS
BODY MASS INDEX: 24.34 KG/M2 | SYSTOLIC BLOOD PRESSURE: 138 MMHG | WEIGHT: 170 LBS | DIASTOLIC BLOOD PRESSURE: 71 MMHG | HEART RATE: 88 BPM | HEIGHT: 70 IN

## 2018-11-20 DIAGNOSIS — Z98.890 STATUS POST RIGHT ROTATOR CUFF REPAIR: Primary | ICD-10-CM

## 2018-11-20 PROCEDURE — G8420 CALC BMI NORM PARAMETERS: HCPCS | Performed by: ORTHOPAEDIC SURGERY

## 2018-11-20 PROCEDURE — 4004F PT TOBACCO SCREEN RCVD TLK: CPT | Performed by: ORTHOPAEDIC SURGERY

## 2018-11-20 PROCEDURE — 97140 MANUAL THERAPY 1/> REGIONS: CPT | Performed by: PHYSICAL THERAPY ASSISTANT

## 2018-11-20 PROCEDURE — 3017F COLORECTAL CA SCREEN DOC REV: CPT | Performed by: ORTHOPAEDIC SURGERY

## 2018-11-20 PROCEDURE — G8427 DOCREV CUR MEDS BY ELIG CLIN: HCPCS | Performed by: ORTHOPAEDIC SURGERY

## 2018-11-20 PROCEDURE — 4040F PNEUMOC VAC/ADMIN/RCVD: CPT | Performed by: ORTHOPAEDIC SURGERY

## 2018-11-20 PROCEDURE — G8984 CARRY CURRENT STATUS: HCPCS | Performed by: PHYSICAL THERAPY ASSISTANT

## 2018-11-20 PROCEDURE — 1123F ACP DISCUSS/DSCN MKR DOCD: CPT | Performed by: ORTHOPAEDIC SURGERY

## 2018-11-20 PROCEDURE — 1101F PT FALLS ASSESS-DOCD LE1/YR: CPT | Performed by: ORTHOPAEDIC SURGERY

## 2018-11-20 PROCEDURE — 99213 OFFICE O/P EST LOW 20 MIN: CPT | Performed by: ORTHOPAEDIC SURGERY

## 2018-11-20 PROCEDURE — 97112 NEUROMUSCULAR REEDUCATION: CPT | Performed by: PHYSICAL THERAPY ASSISTANT

## 2018-11-20 PROCEDURE — G8484 FLU IMMUNIZE NO ADMIN: HCPCS | Performed by: ORTHOPAEDIC SURGERY

## 2018-11-20 PROCEDURE — G8985 CARRY GOAL STATUS: HCPCS | Performed by: PHYSICAL THERAPY ASSISTANT

## 2018-11-20 PROCEDURE — 97110 THERAPEUTIC EXERCISES: CPT | Performed by: PHYSICAL THERAPY ASSISTANT

## 2018-11-20 PROCEDURE — G8986 CARRY D/C STATUS: HCPCS | Performed by: PHYSICAL THERAPY ASSISTANT

## 2018-11-20 NOTE — FLOWSHEET NOTE
[address], Stephanie     Daily Treatment Note  Date:  2018    Patient Name:  Mayra Madden    :  1950  MRN: [id number]  Medical/Treatment Diagnosis Information:   Diagnosis:  Massive Right RTC Repair, Biceps Tenodesis, SAD 8/15/18  Treatment Diagnosis: M31.397   Insurance/Certification information: PT Insurance Information: Medicare/Medicaid    Physician Information:   Referring Practitioner: Beronica Jose of care signed (Y/N):     Date of Patient follow up with Physician:      G-Code (if applicable): Quick Dash 16%    Date G-Code Applied:  2018       Progress Note: [x]  Yes  []  No      Latex Allergy:  [x]NO      []YES     Preferred Language for Healthcare:   []English       []other:     Visit # Insurance Allowable   17 Med Nec     Pain level:  7/10     SUBJECTIVE:  MD pleased with ROM, strength and progress. OK'd to D/C to HEP at this point.    OBJECTIVE:   Observation: Patient requires cueing to slow down and pay attention to form with all exercises   Test measurements:      Flexion: 160 °          ER: 45 °   Patient educated on following:    RESTRICTIONS/PRECAUTIONS: Massive RTC repair    Exercises/Interventions:   Therapeutic Ex Wt/Sets/Reps/Hold Notes   Pulley 5'         Ball on Wall 4 way x20 ea    Wall Walk with assist  x20         Sub max isos    Bicep Curls      Wall Push Up x20       PRE Flexion / scaption X20 ea          TB rows/ext  TB ER/ER [id number] ea  x20 eaGTB  Red/Yellow      LLLD ER [id number] 1 min      Table Slides Flex/Scap      Table ER stretch                             Sleeper stretch 10\" x15    Cane press with ecc lower to waist x20    Cane supine punch 3# x30    Cane flexion  Cane ER  3# x20  x15    SL ER -SL ABD (short lever) 1# 2x10    1# 2x10    Prone ext, row + scap stab 1# 2 x 10   Supine flexion / Supine punch  X20 / x20         Manual     Gr I-II mobs for tissue reactivity 5'    PROM-all planes 15'

## 2018-11-27 ENCOUNTER — APPOINTMENT (OUTPATIENT)
Dept: PHYSICAL THERAPY | Age: 68
End: 2018-11-27
Payer: MEDICARE

## 2018-11-29 ENCOUNTER — APPOINTMENT (OUTPATIENT)
Dept: PHYSICAL THERAPY | Age: 68
End: 2018-11-29
Payer: MEDICARE

## 2019-02-19 ENCOUNTER — OFFICE VISIT (OUTPATIENT)
Dept: ORTHOPEDIC SURGERY | Age: 69
End: 2019-02-19
Payer: MEDICARE

## 2019-02-19 VITALS — WEIGHT: 169.97 LBS | BODY MASS INDEX: 24.33 KG/M2 | HEIGHT: 70 IN

## 2019-02-19 DIAGNOSIS — Z98.890 STATUS POST RIGHT ROTATOR CUFF REPAIR: Primary | ICD-10-CM

## 2019-02-19 PROCEDURE — G8427 DOCREV CUR MEDS BY ELIG CLIN: HCPCS | Performed by: ORTHOPAEDIC SURGERY

## 2019-02-19 PROCEDURE — G8484 FLU IMMUNIZE NO ADMIN: HCPCS | Performed by: ORTHOPAEDIC SURGERY

## 2019-02-19 PROCEDURE — 1101F PT FALLS ASSESS-DOCD LE1/YR: CPT | Performed by: ORTHOPAEDIC SURGERY

## 2019-02-19 PROCEDURE — G8420 CALC BMI NORM PARAMETERS: HCPCS | Performed by: ORTHOPAEDIC SURGERY

## 2019-02-19 PROCEDURE — 1123F ACP DISCUSS/DSCN MKR DOCD: CPT | Performed by: ORTHOPAEDIC SURGERY

## 2019-02-19 PROCEDURE — 4040F PNEUMOC VAC/ADMIN/RCVD: CPT | Performed by: ORTHOPAEDIC SURGERY

## 2019-02-19 PROCEDURE — 99213 OFFICE O/P EST LOW 20 MIN: CPT | Performed by: ORTHOPAEDIC SURGERY

## 2019-02-19 PROCEDURE — 4004F PT TOBACCO SCREEN RCVD TLK: CPT | Performed by: ORTHOPAEDIC SURGERY

## 2019-02-19 PROCEDURE — 3017F COLORECTAL CA SCREEN DOC REV: CPT | Performed by: ORTHOPAEDIC SURGERY

## 2019-04-25 ENCOUNTER — ANESTHESIA EVENT (OUTPATIENT)
Dept: OPERATING ROOM | Age: 69
End: 2019-04-25
Payer: MEDICARE

## 2019-04-25 RX ORDER — VITAMIN E 268 MG
400 CAPSULE ORAL DAILY
COMMUNITY

## 2019-04-25 ASSESSMENT — LIFESTYLE VARIABLES: SMOKING_STATUS: 1

## 2019-04-25 NOTE — PROGRESS NOTES
1.  Do not eat or drink anything after 12 midnight prior to surgery. This includes no water, chewing gum or mints. 2.  Take the following pills with a small sip of water on the morning of surgery   3. Aspirin, Ibuprofen, Advil, Naproxen, Vitamin E and other Anti-inflammatory products should be stopped for 5 days before surgery or as directed by your physician. 4.  Check with your doctor regarding stopping Plavix, Coumadin, Lovenox, Fragmin or other blood thinners. 5.  Do not smoke and do not drink alcoholic beverages 24 hours prior to surgery. This includes NA Beer. 6.  You may brush your teeth and gargle the morning of surgery. DO NOT SWALLOW WATER.  7.  You MUST make arrangements for a responsible adult to take you home after your surgery. You will not be allowed to leave alone or drive yourself home. It is strongly suggested someone stay with you the first 24 hours. Your surgery will be cancelled if you do not have a ride home. 8.  A parent/legal guardian must accompany a child scheduled for surgery and plan to stay at the hospital until the child is discharged. Please do not bring other children with you. 9.  Please wear simple, loose fitting clothing to the hospital.  Alayna Reins not bring valuables ( money, credit cards, checkbooks, etc.)  Do not wear any makeup (including no eye makeup) or nail polish on your fingers or toes. 10.  Do not wear any jewelry or piercing on the day of surgery. All body piercing jewelry must be removed. 11.  If you have dentures, they will be removed before going to the OR; we will provide you a container. If you wear contact lenses or glasses, they will be removed; please bring a case for them. 12.  Please see your family doctor/pediatrician for a history & physical and/or concerning medications. Bring any test results/reports from your physician's office the day of surgery. 15.  Remember to bring Blood Bank Bracelet to the hospital on the day of surgery.   14.  If you have a Living Will and Durable Power of  for Healthcare, please bring in a copy. 13.  Notify your Surgeon if you develop any illness between now and surgery time; cough, cold, fever, sore throat, nausea, vomiting, etc.  Please notify your surgeon if you experience dizziness, shortness of breath or blurred vision between now and the time of your surgery. 16.  DO NOT shave your operative site 96 hours (4 days) prior to surgery. For face and neck surgery, men may use an electric razor 48 hours (2 days) prior to surgery. 17. Shower the night before surgery and the morning of surgery with  an antibacterial soap   or  Chlorhexidine gluconate (for total joint replacement). To provide excellent care, visitors will be limited to two in a room at any given time. Please no children under the age of 15 in the surgical department.

## 2019-04-26 ENCOUNTER — ANESTHESIA (OUTPATIENT)
Dept: OPERATING ROOM | Age: 69
End: 2019-04-26
Payer: MEDICARE

## 2019-04-26 ENCOUNTER — HOSPITAL ENCOUNTER (OUTPATIENT)
Age: 69
Setting detail: OUTPATIENT SURGERY
Discharge: HOME | End: 2019-04-26
Attending: OPHTHALMOLOGY | Admitting: OPHTHALMOLOGY
Payer: MEDICARE

## 2019-04-26 VITALS
BODY MASS INDEX: 24.34 KG/M2 | SYSTOLIC BLOOD PRESSURE: 118 MMHG | HEIGHT: 70 IN | HEART RATE: 73 BPM | WEIGHT: 170 LBS | OXYGEN SATURATION: 94 % | RESPIRATION RATE: 20 BRPM | DIASTOLIC BLOOD PRESSURE: 69 MMHG | TEMPERATURE: 97.8 F

## 2019-04-26 VITALS — OXYGEN SATURATION: 97 % | DIASTOLIC BLOOD PRESSURE: 64 MMHG | SYSTOLIC BLOOD PRESSURE: 111 MMHG

## 2019-04-26 LAB
GLUCOSE BLD-MCNC: 132 MG/DL (ref 70–99)
PERFORMED ON: ABNORMAL

## 2019-04-26 PROCEDURE — 6370000000 HC RX 637 (ALT 250 FOR IP): Performed by: OPHTHALMOLOGY

## 2019-04-26 PROCEDURE — 2500000003 HC RX 250 WO HCPCS: Performed by: OPHTHALMOLOGY

## 2019-04-26 PROCEDURE — 7100000011 HC PHASE II RECOVERY - ADDTL 15 MIN: Performed by: OPHTHALMOLOGY

## 2019-04-26 PROCEDURE — 6360000002 HC RX W HCPCS: Performed by: NURSE ANESTHETIST, CERTIFIED REGISTERED

## 2019-04-26 PROCEDURE — 7100000010 HC PHASE II RECOVERY - FIRST 15 MIN: Performed by: OPHTHALMOLOGY

## 2019-04-26 PROCEDURE — 3600000003 HC SURGERY LEVEL 3 BASE: Performed by: OPHTHALMOLOGY

## 2019-04-26 PROCEDURE — 2580000003 HC RX 258: Performed by: ANESTHESIOLOGY

## 2019-04-26 PROCEDURE — 3700000000 HC ANESTHESIA ATTENDED CARE: Performed by: OPHTHALMOLOGY

## 2019-04-26 PROCEDURE — 3700000001 HC ADD 15 MINUTES (ANESTHESIA): Performed by: OPHTHALMOLOGY

## 2019-04-26 PROCEDURE — 2709999900 HC NON-CHARGEABLE SUPPLY: Performed by: OPHTHALMOLOGY

## 2019-04-26 PROCEDURE — 6360000002 HC RX W HCPCS: Performed by: OPHTHALMOLOGY

## 2019-04-26 PROCEDURE — V2632 POST CHMBR INTRAOCULAR LENS: HCPCS | Performed by: OPHTHALMOLOGY

## 2019-04-26 PROCEDURE — 2500000003 HC RX 250 WO HCPCS: Performed by: ANESTHESIOLOGY

## 2019-04-26 PROCEDURE — 2500000003 HC RX 250 WO HCPCS: Performed by: NURSE ANESTHETIST, CERTIFIED REGISTERED

## 2019-04-26 PROCEDURE — 3600000013 HC SURGERY LEVEL 3 ADDTL 15MIN: Performed by: OPHTHALMOLOGY

## 2019-04-26 DEVICE — ACRYSOF(R) IQ ASPHERIC IOL SP ACRYLIC FOLDABLELENS WULTRASERT(TM) DELIVERY SYSTEM UV WBLUE LIGHT FILTER. 13.0MM LENGTH 6.0MM ANTERIORASYMMETRIC BICONVEX OPTIC PLANAR HAPTICS.
Type: IMPLANTABLE DEVICE | Site: EYE | Status: FUNCTIONAL
Brand: ACRYSOF ULTRASERT

## 2019-04-26 RX ORDER — SODIUM CHLORIDE 0.9 % (FLUSH) 0.9 %
10 SYRINGE (ML) INJECTION PRN
Status: DISCONTINUED | OUTPATIENT
Start: 2019-04-26 | End: 2019-04-26 | Stop reason: HOSPADM

## 2019-04-26 RX ORDER — PROPOFOL 10 MG/ML
INJECTION, EMULSION INTRAVENOUS PRN
Status: DISCONTINUED | OUTPATIENT
Start: 2019-04-26 | End: 2019-04-26 | Stop reason: SDUPTHER

## 2019-04-26 RX ORDER — TETRACAINE HYDROCHLORIDE 5 MG/ML
SOLUTION OPHTHALMIC PRN
Status: DISCONTINUED | OUTPATIENT
Start: 2019-04-26 | End: 2019-04-26 | Stop reason: ALTCHOICE

## 2019-04-26 RX ORDER — TOBRAMYCIN AND DEXAMETHASONE 3; 1 MG/ML; MG/ML
1 SUSPENSION/ DROPS OPHTHALMIC SEE ADMIN INSTRUCTIONS
Status: DISCONTINUED | OUTPATIENT
Start: 2019-04-26 | End: 2019-04-26 | Stop reason: HOSPADM

## 2019-04-26 RX ORDER — SODIUM CHLORIDE, SODIUM LACTATE, POTASSIUM CHLORIDE, CALCIUM CHLORIDE 600; 310; 30; 20 MG/100ML; MG/100ML; MG/100ML; MG/100ML
INJECTION, SOLUTION INTRAVENOUS CONTINUOUS
Status: DISCONTINUED | OUTPATIENT
Start: 2019-04-26 | End: 2019-04-26 | Stop reason: HOSPADM

## 2019-04-26 RX ORDER — LIDOCAINE HYDROCHLORIDE 10 MG/ML
1 INJECTION, SOLUTION EPIDURAL; INFILTRATION; INTRACAUDAL; PERINEURAL
Status: COMPLETED | OUTPATIENT
Start: 2019-04-26 | End: 2019-04-26

## 2019-04-26 RX ORDER — SODIUM CHLORIDE 0.9 % (FLUSH) 0.9 %
10 SYRINGE (ML) INJECTION EVERY 12 HOURS SCHEDULED
Status: DISCONTINUED | OUTPATIENT
Start: 2019-04-26 | End: 2019-04-26 | Stop reason: HOSPADM

## 2019-04-26 RX ORDER — PREDNISOLONE ACETATE 10 MG/ML
SUSPENSION/ DROPS OPHTHALMIC PRN
Status: DISCONTINUED | OUTPATIENT
Start: 2019-04-26 | End: 2019-04-26 | Stop reason: ALTCHOICE

## 2019-04-26 RX ORDER — TOBRAMYCIN AND DEXAMETHASONE 3; 1 MG/ML; MG/ML
SUSPENSION/ DROPS OPHTHALMIC PRN
Status: DISCONTINUED | OUTPATIENT
Start: 2019-04-26 | End: 2019-04-26 | Stop reason: ALTCHOICE

## 2019-04-26 RX ORDER — LIDOCAINE HYDROCHLORIDE 20 MG/ML
INJECTION, SOLUTION INFILTRATION; PERINEURAL PRN
Status: DISCONTINUED | OUTPATIENT
Start: 2019-04-26 | End: 2019-04-26 | Stop reason: SDUPTHER

## 2019-04-26 RX ORDER — PILOCARPINE HYDROCHLORIDE 20 MG/ML
SOLUTION/ DROPS OPHTHALMIC PRN
Status: DISCONTINUED | OUTPATIENT
Start: 2019-04-26 | End: 2019-04-26 | Stop reason: ALTCHOICE

## 2019-04-26 RX ORDER — PREDNISOLONE ACETATE 10 MG/ML
1 SUSPENSION/ DROPS OPHTHALMIC SEE ADMIN INSTRUCTIONS
Status: DISCONTINUED | OUTPATIENT
Start: 2019-04-26 | End: 2019-04-26 | Stop reason: HOSPADM

## 2019-04-26 RX ORDER — SODIUM CHLORIDE, POTASSIUM CHLORIDE, CALCIUM CHLORIDE, MAGNESIUM CHLORIDE, SODIUM ACETATE, AND SODIUM CITRATE 6.4; .75; .48; .3; 3.9; 1.7 MG/ML; MG/ML; MG/ML; MG/ML; MG/ML; MG/ML
SOLUTION IRRIGATION PRN
Status: DISCONTINUED | OUTPATIENT
Start: 2019-04-26 | End: 2019-04-26 | Stop reason: ALTCHOICE

## 2019-04-26 RX ADMIN — PROPOFOL 100 MG: 10 INJECTION, EMULSION INTRAVENOUS at 11:56

## 2019-04-26 RX ADMIN — BROMFENAC SODIUM 1 DROP: 0.7 SOLUTION/ DROPS OPHTHALMIC at 11:15

## 2019-04-26 RX ADMIN — SODIUM CHLORIDE, POTASSIUM CHLORIDE, SODIUM LACTATE AND CALCIUM CHLORIDE: 600; 310; 30; 20 INJECTION, SOLUTION INTRAVENOUS at 11:27

## 2019-04-26 RX ADMIN — LIDOCAINE HYDROCHLORIDE 40 MG: 20 INJECTION, SOLUTION INFILTRATION; PERINEURAL at 11:56

## 2019-04-26 RX ADMIN — LIDOCAINE HYDROCHLORIDE 0.1 ML: 10 INJECTION, SOLUTION EPIDURAL; INFILTRATION; INTRACAUDAL; PERINEURAL at 11:27

## 2019-04-26 RX ADMIN — Medication 0.3 ML: at 11:16

## 2019-04-26 RX ADMIN — TOBRAMYCIN AND DEXAMETHASONE 1 DROP: 3; 1 SUSPENSION/ DROPS OPHTHALMIC at 12:53

## 2019-04-26 RX ADMIN — Medication 0.3 ML: at 11:33

## 2019-04-26 RX ADMIN — Medication 0.3 ML: at 11:26

## 2019-04-26 ASSESSMENT — PULMONARY FUNCTION TESTS
PIF_VALUE: 0

## 2019-04-26 ASSESSMENT — ENCOUNTER SYMPTOMS: SHORTNESS OF BREATH: 0

## 2019-04-26 ASSESSMENT — PAIN SCALES - GENERAL
PAINLEVEL_OUTOF10: 0
PAINLEVEL_OUTOF10: 0

## 2019-04-26 ASSESSMENT — PAIN - FUNCTIONAL ASSESSMENT: PAIN_FUNCTIONAL_ASSESSMENT: 0-10

## 2019-04-26 NOTE — ANESTHESIA POSTPROCEDURE EVALUATION
Department of Anesthesiology  Postprocedure Note    Patient: Margaret Quinnr  MRN: [id number]  YOB: 1950  Date of evaluation: 4/26/2019    Procedure Summary     Date:  04/26/19 Room / Location:  [place] [address] / [facility] OR    Anesthesia Start:  5092 Anesthesia Stop:  2993    Procedure:  PHACO EMULSIFICATION OF CATARACT WITH  INTRAOCULAR LENS IMPLANT LEFT EYE (Left Eye) Diagnosis:  (CATARACT LEFT EYE)    Surgeon:  Gabriel Doherty MD Responsible Provider:  Carlos Michelle MD    Anesthesia Type:  TIVA ASA Status:  3        Anesthesia Type: TIVA    Adama Phase I: Adama Score: 10    Adama Phase II: Adama Score: 10    Last vitals: Reviewed and per EMR flowsheets.      Anesthesia Post Evaluation   Anesthetic Problems: no   Last Vitals:     BP: 116/68 Pulse: 77   Resp: 20 SpO2: 93   Temp: 98.2 °F (36.8 °C)     Cardiovascular System Stable: yes  Respiratory Function: Airway Patent yes  ETT no  Ventilator no  Level of consciousness: awake, alert and oriented  Post-op pain: adequate analgesia  Hydration Adequate: yes  Nausea/Vomiting:no  Other Issues:     Reggie Calderon MD

## 2019-04-26 NOTE — ANESTHESIA PRE PROCEDURE
Department of Anesthesiology  Preprocedure Note       Name:  Eugenia Diaz   Age:  77 y.o.  :  1950                                          MRN:  [id number]         Date:  2019      Surgeon: Melany Spencer MD    Procedure: DR JOHN CRUZ JUDITH [facility] EMULSIFICATION OF CATARACT WITH  INTRAOCULAR LENS IMPLANT LEFT EYE (Left Eye)    Medications prior to admission:    insulin aspart (NOVOLOG)   Inject 4 Units into the skin 3 times daily (before meals)   vitamin E 400 UNIT capsule Take 400 Units by mouth daily   pantoprazole (PROTONIX) 40 MG tablet Take 40 mg by mouth daily   b complex vitamins capsule Take 1 capsule by mouth daily   atorvastatin (LIPITOR) 80 MG tablet Take 80 mg by mouth daily    vitamin D (CHOLECALCIFEROL) 1000 UNIT  Take 1,000 Units by mouth daily   aspirin 81 MG tablet Take 81 mg by mouth daily   Omega-3 Fatty Acids (FISH OIL) 1000 MG CAPS Take 3,000 mg by mouth daily     Allergies:     Sulfa Antibiotics Hives    Pcn [Penicillins] Hives     Problem List:     Chronic calculous cholecystitis K80.10    Chronic calculous cholecystitis K80.10    Olecranon bursitis of right elbow M70.21    Rotator cuff disorder, unspecified laterality M67.919    Tobacco abuse Z72.0    Status post right rotator cuff repair Z98.890     Past Medical History:     Back pain     CAD (coronary artery disease)     Diabetes mellitus ([facility] [address]     Hypertension      Past Surgical History:     CHOLECYSTECTOMY  2016    COLONOSCOPY      HAND SURGERY Right     NM SHLDR ARTHROSCOP,SURG,W/ROTAT CUFF REPR Right 8/15/2018    RIGHT SHOULDER VIDEO ARTHROSCOPY, ROTATOR CUFF REPAIR, AUGMENTATION WITH ROTATION MEDICAL GRAFT, BICEPS TENODESIS, SUBACROMIAL DECOMPRESSION performed by Amy Martinez DO at [facility]     Social History:    Tobacco Use    Smoking status: Current Every Day Smoker     Packs/day: 1.00    Smokeless tobacco: Never Used    Tobacco comment: Using the E cigarette   Substance Use Topics    Alcohol use: No     Alcohol/week: 1.2 oz     Types: 2 Cans of beer per week     Comment: occasionally couple times a month     Vital Signs (Current):    BP: 116/68 Pulse: 77   Resp: 20 SpO2: 93   Temp: 98.2 °F (36.8 °C)   Height: 5' 10\" (1.778 m)  (04/25/19) Weight: 170 lb (77.1 kg)  (04/25/19)   BMI: 24.4           BP Readings from Last 3 Encounters:   11/20/18 138/71   08/15/18 117/67   08/15/18 133/79     NPO Status: > 8 hrs    Anesthesia Evaluation  Patient summary reviewed and Nursing notes reviewed  Airway: Mallampati: II  TM distance: >3 FB   Neck ROM: limited  Mouth opening: > = 3 FB Dental:    (+) lower dentures and upper dentures      Pulmonary:   (+) current smoker    (-) shortness of breath                           Cardiovascular:  Exercise tolerance: good (>4 METS),   (+) hypertension:, CAD:, hyperlipidemia    (-)  angina and  ANGEL             ROS comment: Stress Test: 2018  SPECT PERFUSION IMAGES:   Satisfactory activity throughout the left ventricle myocardium at stress and at rest   LVEF:   63 %  LV WALL MOTION:  Normal without areas of hypo- or dyskinesia. Neuro/Psych:      (-) seizures, TIA and CVA           GI/Hepatic/Renal:   (+) PUD,      (-) no renal disease       Endo/Other:    (+) DiabetesType II DM, using insulin, .    (-) hypothyroidism               Abdominal:           Vascular:     - DVT and PE. Anesthesia Plan      TIVA     ASA 3       Induction: intravenous. Anesthetic plan and risks discussed with patient. Plan discussed with CRNA.             Adam Castillo MD

## 2019-04-26 NOTE — OP NOTE
OPERATIVE NOTE    Patient Name   Date of Birth Age  MRNGrisel Guerrero   1950   77 y.o.  [id number]       Surgeon        Surgery Date  Nuvia Carmona        4/26/2019       Preoperative Diagnosis: Nuclear Sclerotic Cataract left eye    Postoperative Diagnosis: Nuclear Sclerotic Cataract left eye    Operative Procedure: Phacoemulsification/ Posterior Chamber Intraocular Lens Implantation          Anesthesia:   Peribulbar Block with MAC    Details of Procedure: The patient was brought to the operating room on the operative stretcher in the supine position with the head resting in the head rest.  After appropriate anesthesia monitoring devices were applied, IV sedation was carried out per the anesthesia service. Once sedation was achieved, a peribulbar block was performed, using a 5 mL combination solution containing 4 mL of 2% lidocaine with 1 mL of Vitrase. Approximately 2-3 mL of this solution was administered in a peribulbar fashion through the lower lid of the operative eye. Once appropriate akinesia and anesthesia were demonstrated, the operative eye was prepped and draped in the usual sterile fashion. Tobradex drops and Tetracain drops were administered. A wire lid speculum was then inserted into the operative eye. A paracentesis was then performed using a 15 degree supersharp blade to enter the globe at the limbus, and a .12 mm colibri forceps was used to stabilize the globe. Viscoat was then instilled into the anterior chamber. A temporal clear cornea groove was then created using the 15 degree supersharp blade. The cornea was then entered using the Gabino 2.2 mm clearcut keratome blade. The capsulotomy was then performed using a cystotome, and the capsulorrhexis was completed using uttrata forceps. The nucleus of the cataract was then hydrodissected and hydrodelineated using sterile BSS on a 27 gauge cannula.   The nucleus of the cataract was then removed using the phacoemilsification/aspiration device. This was performed in a bimanual/CHOP technique. The remaining cortex was then removed using the irrigation/aspiration device. The posterior capsule was polished using the I/A device with CAP/VAC settings. The capsular bag was reformed using Provisc. The previously selected Gabino Acrysof + 17.5 diopter [id number] intraocular lens implant was then inserted using the cartridge system. It was spun in place using a Kuglen hook. It was centered and found to be in good, stable position. The remaining viscoelastic was then removed using the I/A device. The corneal incision was then hydrated using sterile BSS on a 30 gauge cannula. It was checked and found to be water-tight. Pilocarpine 2%, followed by Tobradex ophthalmic solutions were then instilled into the operative eye. The wire lid speculum was removed, and a postoperative protective shield was applied. The patient was then transferred to the postanesthesia recovery unit in good stable condition. The patient tolerated the procedure well without complications. A postoperative instruction sheet was given, and the patient will follow up with Dr. Lazaro Valenzuela office as scheduled.       EBL: none    Specimen: none

## 2019-04-26 NOTE — H&P
negative  MUSCULOSKELETAL:  negative  NEUROLOGICAL:  negative    PHYSICAL EXAM:    VITALS:  Ht 5' 10\" (1.778 m)   Wt 170 lb (77.1 kg)   BMI 24.39 kg/m²   TEMPERATURE:  Current -  ; Max - No data recorded  RESPIRATIONS RANGE: No data recorded  PULSE RANGE: No data recorded  BLOOD PRESSURE RANGE:  No data recorded.  ; No data recorded. CONSTITUTIONAL:  awake, alert, cooperative, no apparent distress, and appears stated age  EYES:  Lids and lashes normal, pupils equal, round and reactive to light, extra ocular muscles intact, sclera clear, conjunctiva normal  ENT:  Normocephalic, without obvious abnormality, atramatic, sinuses nontender on palpation, external ears without lesions, oral pharynx with moist mucus membranes, tonsils without erythema or exudates, gums normal and good dentition. NECK:  Supple, symmetrical, trachea midline, no adenopathy, thyroid symmetric, not enlarged and no tenderness, skin normal  LUNGS:  No increased work of breathing, good air exchange, clear to auscultation bilaterally, no crackles or wheezing  CARDIOVASCULAR:  Normal apical impulse, regular rate and rhythm, normal S1 and S2, no S3 or S4, and no murmur noted  ABDOMEN:  No scars, normal bowel sounds, soft, non-distended, non-tender, no masses palpated, no hepatosplenomegally  MUSCULOSKELETAL:  There is no redness, warmth, or swelling of the joints. Full range of motion noted. Motor strength is 5 out of 5 all extremities bilaterally. Tone is normal.  NEUROLOGIC:  Awake, alert, oriented to name, place and time. Cranial nerves II-XII are grossly intact. Motor is 5 out of 5 bilaterally. Cerebellar finger to nose, heel to shin intact. Sensory is intact.   Babinski down going, Romberg negative, and gait is normal.      Impression: Visually Significant Cataract    Plan: [address]
negative, and gait is normal.      Impression: Visually Significant Cataract    Plan: [address]

## 2019-04-26 NOTE — PROGRESS NOTES
Assessment unchanged. Denies pain. Tolerating po fluids. Discharged to home with friend via wheelchair.

## 2019-05-22 ENCOUNTER — ANESTHESIA EVENT (OUTPATIENT)
Dept: OPERATING ROOM | Age: 69
End: 2019-05-22
Payer: MEDICARE

## 2019-05-23 ENCOUNTER — HOSPITAL ENCOUNTER (OUTPATIENT)
Age: 69
Setting detail: OUTPATIENT SURGERY
Discharge: HOME | End: 2019-05-23
Attending: OPHTHALMOLOGY | Admitting: OPHTHALMOLOGY
Payer: MEDICARE

## 2019-05-23 ENCOUNTER — ANESTHESIA (OUTPATIENT)
Dept: OPERATING ROOM | Age: 69
End: 2019-05-23
Payer: MEDICARE

## 2019-05-23 VITALS
TEMPERATURE: 97 F | RESPIRATION RATE: 16 BRPM | SYSTOLIC BLOOD PRESSURE: 118 MMHG | BODY MASS INDEX: 25.05 KG/M2 | HEART RATE: 71 BPM | HEIGHT: 70 IN | OXYGEN SATURATION: 96 % | WEIGHT: 175 LBS | DIASTOLIC BLOOD PRESSURE: 65 MMHG

## 2019-05-23 VITALS — SYSTOLIC BLOOD PRESSURE: 108 MMHG | DIASTOLIC BLOOD PRESSURE: 61 MMHG | OXYGEN SATURATION: 99 %

## 2019-05-23 LAB
GLUCOSE BLD-MCNC: 122 MG/DL (ref 70–99)
PERFORMED ON: ABNORMAL

## 2019-05-23 PROCEDURE — V2632 POST CHMBR INTRAOCULAR LENS: HCPCS | Performed by: OPHTHALMOLOGY

## 2019-05-23 PROCEDURE — 6370000000 HC RX 637 (ALT 250 FOR IP): Performed by: OPHTHALMOLOGY

## 2019-05-23 PROCEDURE — 7100000011 HC PHASE II RECOVERY - ADDTL 15 MIN: Performed by: OPHTHALMOLOGY

## 2019-05-23 PROCEDURE — 3700000001 HC ADD 15 MINUTES (ANESTHESIA): Performed by: OPHTHALMOLOGY

## 2019-05-23 PROCEDURE — 3600000003 HC SURGERY LEVEL 3 BASE: Performed by: OPHTHALMOLOGY

## 2019-05-23 PROCEDURE — 6360000002 HC RX W HCPCS: Performed by: NURSE ANESTHETIST, CERTIFIED REGISTERED

## 2019-05-23 PROCEDURE — 2500000003 HC RX 250 WO HCPCS: Performed by: ANESTHESIOLOGY

## 2019-05-23 PROCEDURE — 3700000000 HC ANESTHESIA ATTENDED CARE: Performed by: OPHTHALMOLOGY

## 2019-05-23 PROCEDURE — 2709999900 HC NON-CHARGEABLE SUPPLY: Performed by: OPHTHALMOLOGY

## 2019-05-23 PROCEDURE — 3600000013 HC SURGERY LEVEL 3 ADDTL 15MIN: Performed by: OPHTHALMOLOGY

## 2019-05-23 PROCEDURE — 6360000002 HC RX W HCPCS: Performed by: OPHTHALMOLOGY

## 2019-05-23 PROCEDURE — 2500000003 HC RX 250 WO HCPCS: Performed by: OPHTHALMOLOGY

## 2019-05-23 PROCEDURE — 2580000003 HC RX 258: Performed by: NURSE ANESTHETIST, CERTIFIED REGISTERED

## 2019-05-23 PROCEDURE — 7100000010 HC PHASE II RECOVERY - FIRST 15 MIN: Performed by: OPHTHALMOLOGY

## 2019-05-23 PROCEDURE — 2500000003 HC RX 250 WO HCPCS: Performed by: NURSE ANESTHETIST, CERTIFIED REGISTERED

## 2019-05-23 PROCEDURE — 2580000003 HC RX 258: Performed by: ANESTHESIOLOGY

## 2019-05-23 DEVICE — ACRYSOF(R) IQ ASPHERIC IOL SP ACRYLIC FOLDABLELENS WULTRASERT(TM) DELIVERY SYSTEM UV WBLUE LIGHT FILTER. 13.0MM LENGTH 6.0MM ANTERIORASYMMETRIC BICONVEX OPTIC PLANAR HAPTICS.
Type: IMPLANTABLE DEVICE | Site: EYE | Status: FUNCTIONAL
Brand: ACRYSOF ULTRASERT

## 2019-05-23 RX ORDER — SODIUM CHLORIDE, POTASSIUM CHLORIDE, CALCIUM CHLORIDE, MAGNESIUM CHLORIDE, SODIUM ACETATE, AND SODIUM CITRATE 6.4; .75; .48; .3; 3.9; 1.7 MG/ML; MG/ML; MG/ML; MG/ML; MG/ML; MG/ML
SOLUTION IRRIGATION PRN
Status: DISCONTINUED | OUTPATIENT
Start: 2019-05-23 | End: 2019-05-23 | Stop reason: ALTCHOICE

## 2019-05-23 RX ORDER — TETRACAINE HYDROCHLORIDE 5 MG/ML
SOLUTION OPHTHALMIC PRN
Status: DISCONTINUED | OUTPATIENT
Start: 2019-05-23 | End: 2019-05-23 | Stop reason: ALTCHOICE

## 2019-05-23 RX ORDER — ONDANSETRON 2 MG/ML
4 INJECTION INTRAMUSCULAR; INTRAVENOUS PRN
Status: DISCONTINUED | OUTPATIENT
Start: 2019-05-23 | End: 2019-05-23 | Stop reason: HOSPADM

## 2019-05-23 RX ORDER — OXYCODONE HYDROCHLORIDE AND ACETAMINOPHEN 5; 325 MG/1; MG/1
1 TABLET ORAL PRN
Status: DISCONTINUED | OUTPATIENT
Start: 2019-05-23 | End: 2019-05-23 | Stop reason: HOSPADM

## 2019-05-23 RX ORDER — SODIUM CHLORIDE 0.9 % (FLUSH) 0.9 %
10 SYRINGE (ML) INJECTION EVERY 12 HOURS SCHEDULED
Status: DISCONTINUED | OUTPATIENT
Start: 2019-05-23 | End: 2019-05-23 | Stop reason: HOSPADM

## 2019-05-23 RX ORDER — MORPHINE SULFATE 10 MG/ML
1 INJECTION, SOLUTION INTRAMUSCULAR; INTRAVENOUS EVERY 5 MIN PRN
Status: DISCONTINUED | OUTPATIENT
Start: 2019-05-23 | End: 2019-05-23 | Stop reason: HOSPADM

## 2019-05-23 RX ORDER — DIPHENHYDRAMINE HYDROCHLORIDE 50 MG/ML
12.5 INJECTION INTRAMUSCULAR; INTRAVENOUS
Status: DISCONTINUED | OUTPATIENT
Start: 2019-05-23 | End: 2019-05-23 | Stop reason: HOSPADM

## 2019-05-23 RX ORDER — HYDRALAZINE HYDROCHLORIDE 20 MG/ML
5 INJECTION INTRAMUSCULAR; INTRAVENOUS EVERY 10 MIN PRN
Status: DISCONTINUED | OUTPATIENT
Start: 2019-05-23 | End: 2019-05-23 | Stop reason: HOSPADM

## 2019-05-23 RX ORDER — LABETALOL HYDROCHLORIDE 5 MG/ML
5 INJECTION, SOLUTION INTRAVENOUS EVERY 10 MIN PRN
Status: DISCONTINUED | OUTPATIENT
Start: 2019-05-23 | End: 2019-05-23 | Stop reason: HOSPADM

## 2019-05-23 RX ORDER — HYDROMORPHONE HCL 110MG/55ML
0.5 PATIENT CONTROLLED ANALGESIA SYRINGE INTRAVENOUS EVERY 5 MIN PRN
Status: DISCONTINUED | OUTPATIENT
Start: 2019-05-23 | End: 2019-05-23 | Stop reason: HOSPADM

## 2019-05-23 RX ORDER — SODIUM CHLORIDE, SODIUM LACTATE, POTASSIUM CHLORIDE, CALCIUM CHLORIDE 600; 310; 30; 20 MG/100ML; MG/100ML; MG/100ML; MG/100ML
INJECTION, SOLUTION INTRAVENOUS CONTINUOUS
Status: DISCONTINUED | OUTPATIENT
Start: 2019-05-23 | End: 2019-05-23 | Stop reason: HOSPADM

## 2019-05-23 RX ORDER — LIDOCAINE HYDROCHLORIDE 10 MG/ML
1 INJECTION, SOLUTION EPIDURAL; INFILTRATION; INTRACAUDAL; PERINEURAL
Status: COMPLETED | OUTPATIENT
Start: 2019-05-23 | End: 2019-05-23

## 2019-05-23 RX ORDER — PILOCARPINE HYDROCHLORIDE 20 MG/ML
SOLUTION/ DROPS OPHTHALMIC PRN
Status: DISCONTINUED | OUTPATIENT
Start: 2019-05-23 | End: 2019-05-23 | Stop reason: ALTCHOICE

## 2019-05-23 RX ORDER — MEPERIDINE HYDROCHLORIDE 50 MG/ML
12.5 INJECTION INTRAMUSCULAR; INTRAVENOUS; SUBCUTANEOUS EVERY 5 MIN PRN
Status: DISCONTINUED | OUTPATIENT
Start: 2019-05-23 | End: 2019-05-23 | Stop reason: HOSPADM

## 2019-05-23 RX ORDER — MORPHINE SULFATE 10 MG/ML
2 INJECTION, SOLUTION INTRAMUSCULAR; INTRAVENOUS EVERY 5 MIN PRN
Status: DISCONTINUED | OUTPATIENT
Start: 2019-05-23 | End: 2019-05-23 | Stop reason: HOSPADM

## 2019-05-23 RX ORDER — TOBRAMYCIN AND DEXAMETHASONE 3; 1 MG/ML; MG/ML
SUSPENSION/ DROPS OPHTHALMIC PRN
Status: DISCONTINUED | OUTPATIENT
Start: 2019-05-23 | End: 2019-05-23 | Stop reason: ALTCHOICE

## 2019-05-23 RX ORDER — SODIUM CHLORIDE 0.9 % (FLUSH) 0.9 %
10 SYRINGE (ML) INJECTION PRN
Status: DISCONTINUED | OUTPATIENT
Start: 2019-05-23 | End: 2019-05-23 | Stop reason: HOSPADM

## 2019-05-23 RX ORDER — SODIUM CHLORIDE, SODIUM LACTATE, POTASSIUM CHLORIDE, CALCIUM CHLORIDE 600; 310; 30; 20 MG/100ML; MG/100ML; MG/100ML; MG/100ML
INJECTION, SOLUTION INTRAVENOUS CONTINUOUS PRN
Status: DISCONTINUED | OUTPATIENT
Start: 2019-05-23 | End: 2019-05-23 | Stop reason: SDUPTHER

## 2019-05-23 RX ORDER — PREDNISOLONE ACETATE 10 MG/ML
1 SUSPENSION/ DROPS OPHTHALMIC SEE ADMIN INSTRUCTIONS
Status: DISCONTINUED | OUTPATIENT
Start: 2019-05-23 | End: 2019-05-23 | Stop reason: HOSPADM

## 2019-05-23 RX ORDER — TOBRAMYCIN AND DEXAMETHASONE 3; 1 MG/ML; MG/ML
1 SUSPENSION/ DROPS OPHTHALMIC SEE ADMIN INSTRUCTIONS
Status: DISCONTINUED | OUTPATIENT
Start: 2019-05-23 | End: 2019-05-23 | Stop reason: HOSPADM

## 2019-05-23 RX ORDER — PREDNISOLONE ACETATE 10 MG/ML
SUSPENSION/ DROPS OPHTHALMIC PRN
Status: DISCONTINUED | OUTPATIENT
Start: 2019-05-23 | End: 2019-05-23 | Stop reason: ALTCHOICE

## 2019-05-23 RX ORDER — HYDROMORPHONE HCL 110MG/55ML
0.25 PATIENT CONTROLLED ANALGESIA SYRINGE INTRAVENOUS EVERY 5 MIN PRN
Status: DISCONTINUED | OUTPATIENT
Start: 2019-05-23 | End: 2019-05-23 | Stop reason: HOSPADM

## 2019-05-23 RX ORDER — OXYCODONE HYDROCHLORIDE AND ACETAMINOPHEN 5; 325 MG/1; MG/1
2 TABLET ORAL PRN
Status: DISCONTINUED | OUTPATIENT
Start: 2019-05-23 | End: 2019-05-23 | Stop reason: HOSPADM

## 2019-05-23 RX ORDER — PROPOFOL 10 MG/ML
INJECTION, EMULSION INTRAVENOUS PRN
Status: DISCONTINUED | OUTPATIENT
Start: 2019-05-23 | End: 2019-05-23 | Stop reason: SDUPTHER

## 2019-05-23 RX ORDER — PROMETHAZINE HYDROCHLORIDE 25 MG/ML
6.25 INJECTION, SOLUTION INTRAMUSCULAR; INTRAVENOUS
Status: DISCONTINUED | OUTPATIENT
Start: 2019-05-23 | End: 2019-05-23 | Stop reason: HOSPADM

## 2019-05-23 RX ORDER — LIDOCAINE HYDROCHLORIDE 20 MG/ML
INJECTION, SOLUTION INFILTRATION; PERINEURAL PRN
Status: DISCONTINUED | OUTPATIENT
Start: 2019-05-23 | End: 2019-05-23 | Stop reason: SDUPTHER

## 2019-05-23 RX ADMIN — SODIUM CHLORIDE, POTASSIUM CHLORIDE, SODIUM LACTATE AND CALCIUM CHLORIDE: 600; 310; 30; 20 INJECTION, SOLUTION INTRAVENOUS at 10:37

## 2019-05-23 RX ADMIN — Medication 0.3 ML: at 09:35

## 2019-05-23 RX ADMIN — Medication 0.3 ML: at 09:48

## 2019-05-23 RX ADMIN — BROMFENAC SODIUM 1 DROP: 0.7 SOLUTION/ DROPS OPHTHALMIC at 09:28

## 2019-05-23 RX ADMIN — LIDOCAINE HYDROCHLORIDE 0.3 ML: 10 INJECTION, SOLUTION EPIDURAL; INFILTRATION; INTRACAUDAL; PERINEURAL at 09:35

## 2019-05-23 RX ADMIN — PROPOFOL 60 MG: 10 INJECTION, EMULSION INTRAVENOUS at 10:37

## 2019-05-23 RX ADMIN — LIDOCAINE HYDROCHLORIDE 20 MG: 20 INJECTION, SOLUTION INFILTRATION; PERINEURAL at 10:37

## 2019-05-23 RX ADMIN — Medication 0.3 ML: at 09:28

## 2019-05-23 RX ADMIN — SODIUM CHLORIDE, POTASSIUM CHLORIDE, SODIUM LACTATE AND CALCIUM CHLORIDE: 600; 310; 30; 20 INJECTION, SOLUTION INTRAVENOUS at 09:36

## 2019-05-23 ASSESSMENT — PULMONARY FUNCTION TESTS
PIF_VALUE: 0

## 2019-05-23 ASSESSMENT — PAIN SCALES - GENERAL: PAINLEVEL_OUTOF10: 0

## 2019-05-23 NOTE — ANESTHESIA POSTPROCEDURE EVALUATION
Department of Anesthesiology  Postprocedure Note    Patient: Olga Chacon  MRN: [id number]  YOB: 1950  Date of evaluation: 5/23/2019  Time:  2:20 PM     Procedure Summary     Date:  05/23/19 Room / Location:  [address]    Anesthesia Start:  1033 Anesthesia Stop:  1108    Procedure:  PHACO EMULSIFICATION OF CATARACT WITH INTRAOCULAR LENS IMPLANT EYE (Right Eye) Diagnosis:  (CATARACT RIGHT EYE)    Surgeon:  Nathanael Closs, MD Responsible Provider:  Desiree Lam MD    Anesthesia Type:  general ASA Status:  2          Anesthesia Type: general    Adama Phase I: Adama Score: 10    Adama Phase II: Adama Score: 10    Last vitals: Reviewed and per EMR flowsheets.        Anesthesia Post Evaluation    Comments: Postoperative Anesthesia Note    Name:    Olga Chacon  MRN:      [id number]    Patient Vitals in the past 12 hrs:  05/23/19 1130, BP:118/65, Temp:97 °F (36.1 °C), Temp src:Temporal, Pulse:71, Resp:16, SpO2:96 %  05/23/19 1106, BP:113/68, Temp:97.2 °F (36.2 °C), Temp src:Temporal, Pulse:75, Resp:18, SpO2:94 %  05/23/19 0925, BP:118/65, Temp:98 °F (36.7 °C), Temp src:Temporal, Pulse:79, Resp:14, SpO2:93 %, Height:5' 10\" (1.778 m), Weight:175 lb (79.4 kg)     LABS:    CBC  Lab Results       Component                Value               Date/Time                  WBC                      7.6                 12/24/2015 03:48 PM        HGB                      16.2                12/24/2015 03:48 PM        HCT                      47.0                12/24/2015 03:48 PM        PLT                      197                 12/24/2015 03:48 PM   RENAL  Lab Results       Component                Value               Date/Time                  NA                       138                 08/16/2016 01:06 PM        K                        4.1                 08/16/2016 01:06 PM        CL                       101                 08/16/2016 01:06 PM        CO2                      27 08/16/2016 01:06 PM        BUN                      11                  08/16/2016 01:06 PM        CREATININE               0.7 (L)             08/16/2016 01:06 PM        GLUCOSE                  174 (H)             08/16/2016 01:06 PM   COAGS  Lab Results       Component                Value               Date/Time                  PROTIME                  12.3                12/24/2015 03:48 PM        INR                      1.08                12/24/2015 03:48 PM        APTT                     32.9                12/24/2015 03:48 PM     Intake & Output:  @80BZXX@    Nausea & Vomiting:  No    Level of Consciousness:  Awake    Pain Assessment:  Adequate analgesia    Anesthesia Complications:  No apparent anesthetic complications    SUMMARY      Vital signs stable  OK to discharge from Stage I post anesthesia care.   Care transferred from Anesthesiology department on discharge from perioperative area

## 2019-05-23 NOTE — OP NOTE
OPERATIVE NOTE    Patient Name   Date of Birth Age  MRNGrisel Atkinson   1950   77 y.o.  [id number]       Surgeon        Surgery Date  Kizzy Zaman        5/23/2019       Preoperative Diagnosis: Nuclear Sclerotic Cataract right eye    Postoperative Diagnosis: Nuclear Sclerotic Cataract right eye    Operative Procedure: Phacoemulsification/ Posterior Chamber Intraocular Lens Implantation          Anesthesia:   Peribulbar Block with MAC    Details of Procedure: The patient was brought to the operating room on the operative stretcher in the supine position with the head resting in the head rest.  After appropriate anesthesia monitoring devices were applied, IV sedation was carried out per the anesthesia service. Once sedation was achieved, a peribulbar block was performed, using a 5 mL combination solution containing 4 mL of 2% lidocaine with 1 mL of Vitrase. Approximately 2-3 mL of this solution was administered in a peribulbar fashion through the lower lid of the operative eye. Once appropriate akinesia and anesthesia were demonstrated, the operative eye was prepped and draped in the usual sterile fashion. Tobradex drops and Tetracain drops were administered. A wire lid speculum was then inserted into the operative eye. A paracentesis was then performed using a 15 degree supersharp blade to enter the globe at the limbus, and a .12 mm colibri forceps was used to stabilize the globe. Viscoat was then instilled into the anterior chamber. A temporal clear cornea groove was then created using the 15 degree supersharp blade. The cornea was then entered using the Gabino 2.2 mm clearcut keratome blade. The capsulotomy was then performed using a cystotome, and the capsulorrhexis was completed using uttrata forceps. The nucleus of the cataract was then hydrodissected and hydrodelineated using sterile BSS on a 27 gauge cannula.   The nucleus of the cataract was then removed using the phacoemilsification/aspiration device. This was performed in a bimanual/CHOP technique. The remaining cortex was then removed using the irrigation/aspiration device. The posterior capsule was polished using the I/A device with CAP/VAC settings. The capsular bag was reformed using Provisc. The previously selected Gabino Acrysof +17.0 diopter [id number] intraocular lens implant was then inserted using the cartridge system. It was spun in place using a Kuglen hook. It was centered and found to be in good, stable position. The remaining viscoelastic was then removed using the I/A device. The corneal incision was then hydrated using sterile BSS on a 30 gauge cannula. It was checked and found to be water-tight. Pilocarpine 2%, followed by Tobradex ophthalmic solutions were then instilled into the operative eye. The wire lid speculum was removed, and a postoperative protective shield was applied. The patient was then transferred to the postanesthesia recovery unit in good stable condition. The patient tolerated the procedure well without complications. A postoperative instruction sheet was given, and the patient will follow up with Dr. Grisel Collins office as scheduled.       EBL: none    Specimen: none

## 2019-05-23 NOTE — ANESTHESIA PRE PROCEDURE
Department of Anesthesiology  Preprocedure Note       Name:  Sincere Rosas   Age:  77 y.o.  :  1950                                          MRN:  [id number]         Date:  2019      Surgeon: Jagjit Postal):  Darci Metzger MD    Procedure: DR JOHN CRUZ JUDITH [facility] EMULSIFICATION OF CATARACT WITH INTRAOCULAR LENS IMPLANT EYE (Right Eye)    Medications prior to admission:   Prior to Admission medications    Medication Sig Start Date End Date Taking? Authorizing Provider   insulin aspart (NOVOLOG) 100 UNIT/ML injection vial Inject 4 Units into the skin 3 times daily (before meals)    Historical Provider, MD   vitamin E 400 UNIT capsule Take 400 Units by mouth daily    Historical Provider, MD   aspirin 81 MG tablet Take 81 mg by mouth daily    Historical Provider, MD   pantoprazole (PROTONIX) 40 MG tablet Take 40 mg by mouth daily    Historical Provider, MD   b complex vitamins capsule Take 1 capsule by mouth daily    Historical Provider, MD   atorvastatin (LIPITOR) 80 MG tablet Take 80 mg by mouth daily  18   Historical Provider, MD   vitamin D (CHOLECALCIFEROL) 1000 UNIT TABS tablet Take 1,000 Units by mouth daily    Historical Provider, MD   Omega-3 Fatty Acids (FISH OIL) 1000 MG CAPS Take 3,000 mg by mouth daily    Historical Provider, MD       Current medications:    No current facility-administered medications for this encounter.       Current Outpatient Medications   Medication Sig Dispense Refill    insulin aspart (NOVOLOG) 100 UNIT/ML injection vial Inject 4 Units into the skin 3 times daily (before meals)      vitamin E 400 UNIT capsule Take 400 Units by mouth daily      aspirin 81 MG tablet Take 81 mg by mouth daily      pantoprazole (PROTONIX) 40 MG tablet Take 40 mg by mouth daily      b complex vitamins capsule Take 1 capsule by mouth daily      atorvastatin (LIPITOR) 80 MG tablet Take 80 mg by mouth daily       vitamin D (CHOLECALCIFEROL) 1000 UNIT TABS tablet Take 1,000 Units by mouth daily      Omega-3 Fatty Acids (FISH OIL) 1000 MG CAPS Take 3,000 mg by mouth daily         Allergies: Allergies   Allergen Reactions    Sulfa Antibiotics Hives    Pcn [Penicillins] Hives       Problem List:    Patient Active Problem List   Diagnosis Code    Chronic calculous cholecystitis K80.10    Chronic calculous cholecystitis K80.10    Olecranon bursitis of right elbow M70.21    Rotator cuff disorder, unspecified laterality M67.919    Tobacco abuse Z72.0    Status post right rotator cuff repair Z98.890       Past Medical History:        Diagnosis Date    Back pain     CAD (coronary artery disease)     Diabetes mellitus ([facility] [address]     Hypertension        Past Surgical History:        Procedure Laterality Date    CHOLECYSTECTOMY  08/16/2016    COLONOSCOPY      HAND SURGERY Right     INTRACAPSULAR CATARACT EXTRACTION Left 4/26/2019    PHACO EMULSIFICATION OF CATARACT WITH  INTRAOCULAR LENS IMPLANT LEFT EYE performed by Ayad Navarro MD at [address] [place] [address] Ne ARTHROSCOP,SURG,W/ROTAT CUFF REPR Right 8/15/2018    RIGHT SHOULDER VIDEO ARTHROSCOPY, ROTATOR CUFF REPAIR, AUGMENTATION WITH ROTATION MEDICAL GRAFT, BICEPS TENODESIS, SUBACROMIAL DECOMPRESSION performed by Levon Gar DO at [place] [address]       Social History:    Social History     Tobacco Use    Smoking status: Current Every Day Smoker     Packs/day: 1.00    Smokeless tobacco: Never Used    Tobacco comment: Using the E cigarette   Substance Use Topics    Alcohol use: No     Alcohol/week: 1.2 oz     Types: 2 Cans of beer per week     Comment: occasionally couple times a month                                Ready to quit: Not Answered  Counseling given: Not Answered  Comment: Using the E cigarette      Vital Signs (Current): There were no vitals filed for this visit.                                            BP Readings from Last 3 Encounters:   04/26/19 111/64   04/26/19 118/69   11/20/18 138/71       NPO Status: BMI:   Wt Readings from Last 3 Encounters:   04/25/19 170 lb (77.1 kg)   02/19/19 169 lb 15.6 oz (77.1 kg)   11/20/18 170 lb (77.1 kg)     There is no height or weight on file to calculate BMI.    CBC:   Lab Results   Component Value Date    WBC 7.6 12/24/2015    RBC 5.44 12/24/2015    HGB 16.2 12/24/2015    HCT 47.0 12/24/2015    MCV 86.4 12/24/2015    RDW 14.3 12/24/2015     12/24/2015       CMP:   Lab Results   Component Value Date     08/16/2016    K 4.1 08/16/2016     08/16/2016    CO2 27 08/16/2016    BUN 11 08/16/2016    CREATININE 0.7 08/16/2016    GFRAA >60 08/16/2016    AGRATIO 1.5 10/07/2013    LABGLOM >60 08/16/2016    GLUCOSE 174 08/16/2016    PROT 7.2 08/16/2016    CALCIUM 9.1 08/16/2016    BILITOT 0.4 08/16/2016    ALKPHOS 85 08/16/2016    AST 16 08/16/2016    ALT 14 08/16/2016       POC Tests: No results for input(s): POCGLU, POCNA, POCK, POCCL, POCBUN, POCHEMO, POCHCT in the last 72 hours. Coags:   Lab Results   Component Value Date    PROTIME 12.3 12/24/2015    INR 1.08 12/24/2015    APTT 32.9 12/24/2015       HCG (If Applicable): No results found for: PREGTESTUR, PREGSERUM, HCG, HCGQUANT     ABGs: No results found for: PHART, PO2ART, XFB2GFG, XMI4MNW, BEART, Z8DVUZXR     Type & Screen (If Applicable):  No results found for: LABABO, [address]    Anesthesia Evaluation  Patient summary reviewed and Nursing notes reviewed no history of anesthetic complications:   Airway: Mallampati: II     Neck ROM: full   Dental:          Pulmonary:Negative Pulmonary ROS and normal exam                               Cardiovascular:Negative CV ROS    (+) hypertension:, CAD (denies):,     (-)  angina                Neuro/Psych:   Negative Neuro/Psych ROS              GI/Hepatic/Renal: Neg GI/Hepatic/Renal ROS       (-) hiatal hernia and GERD       Endo/Other: Negative Endo/Other ROS   (+) Diabetes, .                  Abdominal:           Vascular: Anesthesia Plan      general     ASA 2     (I discussed with the patient the risks and benefits of PIV, general anesthesia, IV Narcotics, PACU. All questions were answered the patient agrees with the plan.)  Induction: intravenous. Sinus rhythm with 1st degree AV block  Otherwise normal ECG  No previous ECG available for comparison    Pre-Operative Diagnosis: CATARACT RIGHT EYE    77 y.o.   BMI:  There is no height or weight on file to calculate BMI. There were no vitals filed for this visit.     Allergies   Allergen Reactions    Sulfa Antibiotics Hives    Pcn [Penicillins] Hives       Social History     Tobacco Use    Smoking status: Current Every Day Smoker     Packs/day: 1.00    Smokeless tobacco: Never Used    Tobacco comment: Using the E cigarette   Substance Use Topics    Alcohol use: No     Alcohol/week: 1.2 oz     Types: 2 Cans of beer per week     Comment: occasionally couple times a month       LABS:    CBC  Lab Results   Component Value Date/Time    WBC 7.6 12/24/2015 03:48 PM    HGB 16.2 12/24/2015 03:48 PM    HCT 47.0 12/24/2015 03:48 PM     12/24/2015 03:48 PM     RENAL  Lab Results   Component Value Date/Time     08/16/2016 01:06 PM    K 4.1 08/16/2016 01:06 PM     08/16/2016 01:06 PM    CO2 27 08/16/2016 01:06 PM    BUN 11 08/16/2016 01:06 PM    CREATININE 0.7 (L) 08/16/2016 01:06 PM    GLUCOSE 174 (H) 08/16/2016 01:06 PM     COAGS  Lab Results   Component Value Date/Time    PROTIME 12.3 12/24/2015 03:48 PM    INR 1.08 12/24/2015 03:48 PM    APTT 32.9 12/24/2015 03:48 PM       Rose Marie Saenz MD   5/23/2019

## 2019-05-23 NOTE — PROGRESS NOTES
Chan Gorman Reymundo    Chief Complaint   Patient presents with    Diabetes    Follow-up       History of Present Illness:   Ms. Dwyer comes in today for 3-month diabetes follow-up.  She is fasting but has not taken medication today.  She states she has not been exercising during holidays but monitors her diet.  She performs home glucose checks twice daily with levels ranging 180-200's.    She states she feels okay today and denies having acute problems. She denies having fever, chills, fatigue, appetite changes; shortness of breath, cough, wheezing; chest pain, palpitations, leg swelling; abdominal pain, nausea, vomiting, diarrhea, constipation; unusual urinary symptoms; polydipsia, polyuria, polyphagia; back pain; headache; anxiety, depression, homicidal or suicidal thoughts.      Labs:                      WBC                      5.92                09/26/2018                 HGB                      12.7                09/26/2018                 HCT                      42.0                09/26/2018                 PLT                      225                 09/26/2018                 CHOL                     152                 09/26/2018                 TRIG                     53                  09/26/2018                 HDL                      61                  09/26/2018                 ALT                      15                  09/26/2018                 AST                      14                  09/26/2018                 NA                       141                 09/26/2018                 K                        4.0                 09/26/2018                 CL                       104                 09/26/2018                 CREATININE               0.8                 09/26/2018                 BUN                      11                  09/26/2018                 CO2                      29                  09/26/2018                 TSH                      1.474            Pt is alert and oriented. Eye drops have been started for post op care, per Dr Akash Rice orders. Pt and family member received printed and verbal education for post op care r/t Dr Akash Rice cataract removal with implant surgery both verbalized understanding and now further questions at this time. Took iv site out, applied pressure and dressed with dry gauze, pt tolerated well. Pt was taken out of department via wheelchair and family is taking pt home. Pt is stable for discharge.     09/26/2018                 GLUF                     118 (H)             06/07/2010                 HGBA1C                   7.1 (H)             09/26/2018           LDLCALC                  80.4                09/26/2018           Vit D, 25-Hydroxy            31              09/26/2018       Microalb Creat Ratio             4.1            09/26/2018       Prot/Creat Ratio, Ur               0.07           09/26/2018        Current Outpatient Medications   Medication Sig    blood sugar diagnostic Strp 1 strip by Misc.(Non-Drug; Combo Route) route 2 (two) times daily.    gabapentin (NEURONTIN) 100 MG capsule Take 1 capsule (100 mg total) by mouth every evening.    glipiZIDE (GLUCOTROL) 5 MG tablet Take 1 tablet (5 mg total) by mouth 2 (two) times daily before meals.    ibuprofen (ADVIL,MOTRIN) 800 MG tablet Take 1 tablet (800 mg total) by mouth every meal as needed for Pain.    lancets Misc 1 lancet by Misc.(Non-Drug; Combo Route) route 3 (three) times daily.    metFORMIN (GLUCOPHAGE) 500 MG tablet TAKE one tablet BY MOUTH in the morning and ONE TABLET BY MOUTH in the evening with meals    vitamin D 1000 units Tab Take 1,000 Units by mouth once daily.    blood-glucose meter (FREESTYLE SYSTEM KIT) kit Use as instructed     Review of Systems   Constitutional: Positive for activity change. Negative for appetite change, chills, fatigue and fever.        Weight 59.6 kg (131 lb 6.3 oz) at September 26, 2018 visit.   Eyes: Negative for discharge.   Respiratory: Negative for cough, shortness of breath and wheezing.    Cardiovascular: Negative for chest pain, palpitations and leg swelling.   Gastrointestinal: Negative for abdominal pain, constipation, diarrhea, nausea and vomiting.   Endocrine: Negative for polydipsia, polyphagia and polyuria.   Genitourinary: Negative for difficulty urinating and frequency.   Musculoskeletal: Negative for arthralgias, back pain and joint swelling.   Neurological: Negative for  headaches.   Psychiatric/Behavioral: Negative for dysphoric mood and suicidal ideas. The patient is not nervous/anxious.         Negative for homicidal ideas.       Objective:  Physical Exam   Constitutional: She is oriented to person, place, and time. She appears well-developed and well-nourished. No distress.   Pleasant.   Neck: Normal range of motion. Neck supple. No thyromegaly present.   Cardiovascular: Normal rate, regular rhythm and intact distal pulses.   No murmur heard.  Pulses:       Dorsalis pedis pulses are 3+ on the right side, and 3+ on the left side.        Posterior tibial pulses are 3+ on the right side, and 3+ on the left side.   Pulmonary/Chest: Effort normal and breath sounds normal. No respiratory distress. She has no wheezes.   Abdominal: Soft. Bowel sounds are normal. She exhibits no distension and no mass. There is no tenderness. There is no rebound and no guarding.   Musculoskeletal: Normal range of motion. She exhibits no edema or tenderness.   She is ambulatory without problems.  Feet look okay without ulcerations or skin breaks noted.    Feet:   Right Foot:   Protective Sensation: 7 sites tested. 7 sites sensed.   Skin Integrity: Negative for ulcer or skin breakdown.   Left Foot:   Protective Sensation: 7 sites tested. 7 sites sensed.   Skin Integrity: Negative for ulcer or skin breakdown.   Lymphadenopathy:     She has no cervical adenopathy.   Neurological: She is alert and oriented to person, place, and time.   Skin: She is not diaphoretic.   Psychiatric: She has a normal mood and affect. Her behavior is normal. Judgment and thought content normal.   Vitals reviewed.      ASSESSMENT:  1. Controlled type 2 diabetes mellitus without complication, without long-term current use of insulin    2. Vitamin D deficiency    3. Surgical menopause        PLAN:  Chan was seen today for diabetes and follow-up.    Diagnoses and all orders for this visit:    Controlled type 2 diabetes mellitus  without complication, without long-term current use of insulin  -     Hemoglobin A1c; Future    Vitamin D deficiency    Surgical menopause         Patient advised to call for results.  Continue current medications, follow low sodium, low cholesterol, low carb diet, daily walks.  Follow-up in about 3 months (around 3/26/2019) for diabetes follow up.

## 2019-05-23 NOTE — H&P
Surgical Service History and Physical    CHIEF COMPLAINT:   Decreased Vision and Glare    Reason for Admission:  Cataract Surgery    History Obtained From:  Patient    HISTORY OF PRESENT ILLNESS:  Pt has noticed painless progressive loss of vision and is experiencing functional difficulty. Past Medical History:        Diagnosis Date    Back pain     CAD (coronary artery disease)     Diabetes mellitus ([facility] [address]     Hypertension        Past Surgical History:        Procedure Laterality Date    CHOLECYSTECTOMY  08/16/2016    COLONOSCOPY      HAND SURGERY Right     INTRACAPSULAR CATARACT EXTRACTION Left 4/26/2019    PHACO EMULSIFICATION OF CATARACT WITH  INTRAOCULAR LENS IMPLANT LEFT EYE performed by Bebeto Moran MD at [address] [place] [address] ARTHROSCOP,SURG,W/ROTAT CUFF REPR Right 8/15/2018    RIGHT SHOULDER VIDEO ARTHROSCOPY, ROTATOR CUFF REPAIR, AUGMENTATION WITH ROTATION MEDICAL GRAFT, BICEPS TENODESIS, SUBACROMIAL DECOMPRESSION performed by Koby Koch DO at [place] [address]       Allergies:  Sulfa antibiotics and Pcn [penicillins]    Prior to Admission medications    Medication Sig Start Date End Date Taking?  Authorizing Provider   insulin aspart (NOVOLOG) 100 UNIT/ML injection vial Inject 4 Units into the skin 3 times daily (before meals)    Historical Provider, MD   vitamin E 400 UNIT capsule Take 400 Units by mouth daily    Historical Provider, MD   aspirin 81 MG tablet Take 81 mg by mouth daily    Historical Provider, MD   pantoprazole (PROTONIX) 40 MG tablet Take 40 mg by mouth daily    Historical Provider, MD   b complex vitamins capsule Take 1 capsule by mouth daily    Historical Provider, MD   atorvastatin (LIPITOR) 80 MG tablet Take 80 mg by mouth daily  6/21/18   Historical Provider, MD   vitamin D (CHOLECALCIFEROL) 1000 UNIT TABS tablet Take 1,000 Units by mouth daily    Historical Provider, MD   Omega-3 Fatty Acids (FISH OIL) 1000 MG CAPS Take 3,000 mg by mouth daily    Historical intact. Motor is 5 out of 5 bilaterally. Cerebellar finger to nose, heel to shin intact. Sensory is intact.   Babinski down going, Romberg negative, and gait is normal.      Impression: Visually Significant Cataract    Plan: Alise Scott

## 2019-07-05 DIAGNOSIS — R91.1 LUNG NODULE: Primary | ICD-10-CM

## 2019-07-05 DIAGNOSIS — R91.8 LUNG NODULES: ICD-10-CM

## 2019-07-08 DIAGNOSIS — Z72.0 TOBACCO ABUSE: ICD-10-CM

## 2019-07-08 DIAGNOSIS — R91.8 LUNG MASS: Primary | ICD-10-CM

## 2019-07-15 ENCOUNTER — HOSPITAL ENCOUNTER (OUTPATIENT)
Dept: GENERAL RADIOLOGY | Age: 69
Discharge: HOME | End: 2019-07-15
Payer: MEDICARE

## 2019-07-15 ENCOUNTER — HOSPITAL ENCOUNTER (OUTPATIENT)
Dept: CT IMAGING | Age: 69
Discharge: HOME | End: 2019-07-15
Payer: MEDICARE

## 2019-07-15 ENCOUNTER — HOSPITAL ENCOUNTER (OUTPATIENT)
Age: 69
Discharge: HOME | End: 2019-07-15
Payer: MEDICARE

## 2019-07-15 VITALS
RESPIRATION RATE: 20 BRPM | DIASTOLIC BLOOD PRESSURE: 71 MMHG | HEART RATE: 63 BPM | OXYGEN SATURATION: 98 % | SYSTOLIC BLOOD PRESSURE: 141 MMHG

## 2019-07-15 DIAGNOSIS — R91.8 LUNG MASS: ICD-10-CM

## 2019-07-15 DIAGNOSIS — R91.8 MASS OF LOWER LOBE OF LEFT LUNG: ICD-10-CM

## 2019-07-15 DIAGNOSIS — Z98.890 S/P BIOPSY: ICD-10-CM

## 2019-07-15 LAB
INR BLD: 1.08 (ref 0.86–1.14)
PLATELET # BLD: 88 K/UL (ref 135–450)
PROTHROMBIN TIME: 12.3 SEC (ref 9.8–13)

## 2019-07-15 PROCEDURE — 99152 MOD SED SAME PHYS/QHP 5/>YRS: CPT

## 2019-07-15 PROCEDURE — 6360000002 HC RX W HCPCS: Performed by: RADIOLOGY

## 2019-07-15 PROCEDURE — 88341 IMHCHEM/IMCYTCHM EA ADD ANTB: CPT

## 2019-07-15 PROCEDURE — 88305 TISSUE EXAM BY PATHOLOGIST: CPT

## 2019-07-15 PROCEDURE — 71045 X-RAY EXAM CHEST 1 VIEW: CPT

## 2019-07-15 PROCEDURE — 77012 CT SCAN FOR NEEDLE BIOPSY: CPT

## 2019-07-15 PROCEDURE — 99153 MOD SED SAME PHYS/QHP EA: CPT

## 2019-07-15 PROCEDURE — 88342 IMHCHEM/IMCYTCHM 1ST ANTB: CPT

## 2019-07-15 PROCEDURE — 85610 PROTHROMBIN TIME: CPT

## 2019-07-15 PROCEDURE — 85049 AUTOMATED PLATELET COUNT: CPT

## 2019-07-15 PROCEDURE — 2709999900 CT NEEDLE BIOPSY LUNG PERCUTANEOUS

## 2019-07-15 PROCEDURE — 88312 SPECIAL STAINS GROUP 1: CPT

## 2019-07-15 PROCEDURE — 36415 COLL VENOUS BLD VENIPUNCTURE: CPT

## 2019-07-15 RX ORDER — MIDAZOLAM HYDROCHLORIDE 5 MG/ML
INJECTION INTRAMUSCULAR; INTRAVENOUS
Status: COMPLETED | OUTPATIENT
Start: 2019-07-15 | End: 2019-07-15

## 2019-07-15 RX ORDER — ACETAMINOPHEN 325 MG/1
650 TABLET ORAL EVERY 4 HOURS PRN
Status: DISCONTINUED | OUTPATIENT
Start: 2019-07-15 | End: 2019-07-16 | Stop reason: HOSPADM

## 2019-07-15 RX ORDER — ONDANSETRON 2 MG/ML
4 INJECTION INTRAMUSCULAR; INTRAVENOUS EVERY 8 HOURS PRN
Status: DISCONTINUED | OUTPATIENT
Start: 2019-07-15 | End: 2019-07-16 | Stop reason: HOSPADM

## 2019-07-15 RX ORDER — FENTANYL CITRATE 50 UG/ML
INJECTION, SOLUTION INTRAMUSCULAR; INTRAVENOUS
Status: COMPLETED | OUTPATIENT
Start: 2019-07-15 | End: 2019-07-15

## 2019-07-15 RX ADMIN — MIDAZOLAM HYDROCHLORIDE 1 MG: 5 INJECTION, SOLUTION INTRAMUSCULAR; INTRAVENOUS at 09:56

## 2019-07-15 RX ADMIN — FENTANYL CITRATE 50 MCG: 50 INJECTION INTRAMUSCULAR; INTRAVENOUS at 09:56

## 2019-07-15 NOTE — PRE SEDATION
Units by mouth daily    Historical Provider, MD   Omega-3 Fatty Acids (FISH OIL) 1000 MG CAPS Take 3,000 mg by mouth daily    Historical Provider, MD     Coumadin Use Last 7 Days:  no  Antiplatelet drug therapy use last 7 days: no  Other anticoagulant use last 7 days: no  Additional Medication Information:        Pre-Sedation Documentation and Exam:   I have reviewed the patient's history and review of systems.     Mallampati Airway Assessment:  normal neck range of motion    Prior History of Anesthesia Complications:   none    ASA Classification:  Class 2 - A normal healthy patient with mild systemic disease    Sedation/ Anesthesia Plan:   intravenous sedation    Medications Planned:   midazolam (Versed) intravenously and fentanyl intravenously    Patient is an appropriate candidate for plan of sedation: yes    Electronically signed by Thai Reese MD on 7/15/2019 at 9:49 AM

## 2019-08-20 ENCOUNTER — OFFICE VISIT (OUTPATIENT)
Dept: ORTHOPEDIC SURGERY | Age: 69
End: 2019-08-20
Payer: MEDICARE

## 2019-08-20 VITALS — HEIGHT: 70 IN | WEIGHT: 175.04 LBS | BODY MASS INDEX: 25.06 KG/M2

## 2019-08-20 DIAGNOSIS — Z98.890 STATUS POST RIGHT ROTATOR CUFF REPAIR: Primary | ICD-10-CM

## 2019-08-20 PROCEDURE — 4004F PT TOBACCO SCREEN RCVD TLK: CPT | Performed by: ORTHOPAEDIC SURGERY

## 2019-08-20 PROCEDURE — 99213 OFFICE O/P EST LOW 20 MIN: CPT | Performed by: ORTHOPAEDIC SURGERY

## 2019-08-20 PROCEDURE — G8419 CALC BMI OUT NRM PARAM NOF/U: HCPCS | Performed by: ORTHOPAEDIC SURGERY

## 2019-08-20 PROCEDURE — G8427 DOCREV CUR MEDS BY ELIG CLIN: HCPCS | Performed by: ORTHOPAEDIC SURGERY

## 2019-08-20 PROCEDURE — 4040F PNEUMOC VAC/ADMIN/RCVD: CPT | Performed by: ORTHOPAEDIC SURGERY

## 2019-08-20 PROCEDURE — 1123F ACP DISCUSS/DSCN MKR DOCD: CPT | Performed by: ORTHOPAEDIC SURGERY

## 2019-08-20 PROCEDURE — 3017F COLORECTAL CA SCREEN DOC REV: CPT | Performed by: ORTHOPAEDIC SURGERY

## 2019-09-09 ENCOUNTER — HOSPITAL ENCOUNTER (OUTPATIENT)
Dept: CARDIAC REHAB | Age: 69
Setting detail: THERAPIES SERIES
Discharge: HOME | End: 2019-09-09
Payer: MEDICARE

## 2019-09-16 ENCOUNTER — HOSPITAL ENCOUNTER (OUTPATIENT)
Dept: CARDIAC REHAB | Age: 69
Setting detail: THERAPIES SERIES
Discharge: HOME | End: 2019-09-16
Payer: MEDICARE

## 2019-09-16 PROCEDURE — 93798 PHYS/QHP OP CAR RHAB W/ECG: CPT

## 2019-09-18 ENCOUNTER — HOSPITAL ENCOUNTER (OUTPATIENT)
Dept: CARDIAC REHAB | Age: 69
Setting detail: THERAPIES SERIES
Discharge: HOME | End: 2019-09-18
Payer: MEDICARE

## 2019-09-18 PROCEDURE — 93798 PHYS/QHP OP CAR RHAB W/ECG: CPT

## 2019-09-20 ENCOUNTER — HOSPITAL ENCOUNTER (OUTPATIENT)
Dept: CARDIAC REHAB | Age: 69
Setting detail: THERAPIES SERIES
Discharge: HOME | End: 2019-09-20
Payer: MEDICARE

## 2019-09-20 PROCEDURE — 93798 PHYS/QHP OP CAR RHAB W/ECG: CPT

## 2019-09-23 ENCOUNTER — HOSPITAL ENCOUNTER (OUTPATIENT)
Dept: CARDIAC REHAB | Age: 69
Setting detail: THERAPIES SERIES
Discharge: HOME | End: 2019-09-23
Payer: MEDICARE

## 2019-09-23 PROCEDURE — 93798 PHYS/QHP OP CAR RHAB W/ECG: CPT

## 2019-09-27 ENCOUNTER — HOSPITAL ENCOUNTER (OUTPATIENT)
Dept: CARDIAC REHAB | Age: 69
Setting detail: THERAPIES SERIES
Discharge: HOME | End: 2019-09-27
Payer: MEDICARE

## 2019-09-27 PROCEDURE — 93798 PHYS/QHP OP CAR RHAB W/ECG: CPT

## 2019-09-30 ENCOUNTER — HOSPITAL ENCOUNTER (OUTPATIENT)
Dept: CARDIAC REHAB | Age: 69
Setting detail: THERAPIES SERIES
Discharge: HOME | End: 2019-09-30
Payer: MEDICARE

## 2019-09-30 PROCEDURE — 93798 PHYS/QHP OP CAR RHAB W/ECG: CPT

## 2019-10-04 ENCOUNTER — HOSPITAL ENCOUNTER (OUTPATIENT)
Dept: CARDIAC REHAB | Age: 69
Setting detail: THERAPIES SERIES
Discharge: HOME | End: 2019-10-04
Payer: MEDICARE

## 2019-10-04 PROCEDURE — 93798 PHYS/QHP OP CAR RHAB W/ECG: CPT

## 2019-10-07 ENCOUNTER — HOSPITAL ENCOUNTER (OUTPATIENT)
Dept: CARDIAC REHAB | Age: 69
Setting detail: THERAPIES SERIES
Discharge: HOME | End: 2019-10-07
Payer: MEDICARE

## 2019-10-07 PROCEDURE — 93798 PHYS/QHP OP CAR RHAB W/ECG: CPT

## 2019-10-14 ENCOUNTER — HOSPITAL ENCOUNTER (OUTPATIENT)
Dept: CARDIAC REHAB | Age: 69
Setting detail: THERAPIES SERIES
Discharge: HOME | End: 2019-10-14
Payer: MEDICARE

## 2019-10-14 PROCEDURE — 93798 PHYS/QHP OP CAR RHAB W/ECG: CPT

## 2019-10-16 ENCOUNTER — HOSPITAL ENCOUNTER (OUTPATIENT)
Dept: CARDIAC REHAB | Age: 69
Setting detail: THERAPIES SERIES
Discharge: HOME | End: 2019-10-16
Payer: MEDICARE

## 2019-10-16 PROCEDURE — 93798 PHYS/QHP OP CAR RHAB W/ECG: CPT

## 2019-10-18 ENCOUNTER — HOSPITAL ENCOUNTER (OUTPATIENT)
Dept: CARDIAC REHAB | Age: 69
Setting detail: THERAPIES SERIES
Discharge: HOME | End: 2019-10-18
Payer: MEDICARE

## 2019-10-18 PROCEDURE — 93798 PHYS/QHP OP CAR RHAB W/ECG: CPT

## 2019-10-23 ENCOUNTER — HOSPITAL ENCOUNTER (OUTPATIENT)
Dept: CARDIAC REHAB | Age: 69
Setting detail: THERAPIES SERIES
Discharge: HOME | End: 2019-10-23
Payer: MEDICARE

## 2019-10-23 PROCEDURE — 93798 PHYS/QHP OP CAR RHAB W/ECG: CPT

## 2019-11-06 ENCOUNTER — HOSPITAL ENCOUNTER (OUTPATIENT)
Dept: CARDIAC REHAB | Age: 69
Setting detail: THERAPIES SERIES
Discharge: HOME | End: 2019-11-06
Payer: MEDICARE

## 2019-11-06 PROCEDURE — 93798 PHYS/QHP OP CAR RHAB W/ECG: CPT

## 2019-11-08 ENCOUNTER — HOSPITAL ENCOUNTER (OUTPATIENT)
Dept: CARDIAC REHAB | Age: 69
Setting detail: THERAPIES SERIES
Discharge: HOME | End: 2019-11-08
Payer: MEDICARE

## 2019-11-08 PROCEDURE — 93798 PHYS/QHP OP CAR RHAB W/ECG: CPT

## 2019-11-11 ENCOUNTER — HOSPITAL ENCOUNTER (OUTPATIENT)
Dept: CARDIAC REHAB | Age: 69
Setting detail: THERAPIES SERIES
Discharge: HOME | End: 2019-11-11
Payer: MEDICARE

## 2019-11-11 PROCEDURE — 93798 PHYS/QHP OP CAR RHAB W/ECG: CPT

## 2019-11-13 ENCOUNTER — HOSPITAL ENCOUNTER (OUTPATIENT)
Dept: CARDIAC REHAB | Age: 69
Setting detail: THERAPIES SERIES
Discharge: HOME | End: 2019-11-13
Payer: MEDICARE

## 2019-11-13 PROCEDURE — 93798 PHYS/QHP OP CAR RHAB W/ECG: CPT

## 2019-11-15 ENCOUNTER — HOSPITAL ENCOUNTER (OUTPATIENT)
Dept: CARDIAC REHAB | Age: 69
Setting detail: THERAPIES SERIES
Discharge: HOME | End: 2019-11-15
Payer: MEDICARE

## 2019-11-15 PROCEDURE — 93798 PHYS/QHP OP CAR RHAB W/ECG: CPT

## 2019-11-20 ENCOUNTER — HOSPITAL ENCOUNTER (OUTPATIENT)
Dept: CARDIAC REHAB | Age: 69
Setting detail: THERAPIES SERIES
Discharge: HOME | End: 2019-11-20
Payer: MEDICARE

## 2019-11-20 PROCEDURE — 93798 PHYS/QHP OP CAR RHAB W/ECG: CPT

## 2019-11-22 ENCOUNTER — HOSPITAL ENCOUNTER (OUTPATIENT)
Dept: CARDIAC REHAB | Age: 69
Setting detail: THERAPIES SERIES
Discharge: HOME | End: 2019-11-22
Payer: MEDICARE

## 2019-11-22 PROCEDURE — 93798 PHYS/QHP OP CAR RHAB W/ECG: CPT

## 2019-11-25 ENCOUNTER — HOSPITAL ENCOUNTER (OUTPATIENT)
Dept: CARDIAC REHAB | Age: 69
Setting detail: THERAPIES SERIES
Discharge: HOME | End: 2019-11-25
Payer: MEDICARE

## 2019-11-25 PROCEDURE — 93798 PHYS/QHP OP CAR RHAB W/ECG: CPT

## 2019-11-27 ENCOUNTER — HOSPITAL ENCOUNTER (OUTPATIENT)
Dept: CARDIAC REHAB | Age: 69
Setting detail: THERAPIES SERIES
Discharge: HOME | End: 2019-11-27
Payer: MEDICARE

## 2019-11-27 PROCEDURE — 93798 PHYS/QHP OP CAR RHAB W/ECG: CPT

## 2019-12-02 ENCOUNTER — HOSPITAL ENCOUNTER (OUTPATIENT)
Dept: CARDIAC REHAB | Age: 69
Setting detail: THERAPIES SERIES
Discharge: HOME | End: 2019-12-02
Payer: MEDICARE

## 2019-12-02 PROCEDURE — 93798 PHYS/QHP OP CAR RHAB W/ECG: CPT

## 2019-12-06 ENCOUNTER — HOSPITAL ENCOUNTER (OUTPATIENT)
Dept: CARDIAC REHAB | Age: 69
Setting detail: THERAPIES SERIES
Discharge: HOME | End: 2019-12-06
Payer: MEDICARE

## 2019-12-06 PROCEDURE — 93798 PHYS/QHP OP CAR RHAB W/ECG: CPT

## 2019-12-09 ENCOUNTER — HOSPITAL ENCOUNTER (OUTPATIENT)
Dept: CARDIAC REHAB | Age: 69
Setting detail: THERAPIES SERIES
Discharge: HOME | End: 2019-12-09
Payer: MEDICARE

## 2019-12-09 PROCEDURE — 93798 PHYS/QHP OP CAR RHAB W/ECG: CPT

## 2019-12-11 ENCOUNTER — HOSPITAL ENCOUNTER (OUTPATIENT)
Dept: CARDIAC REHAB | Age: 69
Setting detail: THERAPIES SERIES
Discharge: HOME | End: 2019-12-11
Payer: MEDICARE

## 2019-12-11 PROCEDURE — 93798 PHYS/QHP OP CAR RHAB W/ECG: CPT

## 2019-12-13 ENCOUNTER — HOSPITAL ENCOUNTER (OUTPATIENT)
Dept: CARDIAC REHAB | Age: 69
Setting detail: THERAPIES SERIES
Discharge: HOME | End: 2019-12-13
Payer: MEDICARE

## 2019-12-13 PROCEDURE — 93798 PHYS/QHP OP CAR RHAB W/ECG: CPT

## 2019-12-16 ENCOUNTER — HOSPITAL ENCOUNTER (OUTPATIENT)
Dept: CARDIAC REHAB | Age: 69
Setting detail: THERAPIES SERIES
Discharge: HOME | End: 2019-12-16
Payer: MEDICARE

## 2019-12-16 PROCEDURE — 93798 PHYS/QHP OP CAR RHAB W/ECG: CPT

## 2019-12-18 ENCOUNTER — HOSPITAL ENCOUNTER (OUTPATIENT)
Dept: CARDIAC REHAB | Age: 69
Setting detail: THERAPIES SERIES
Discharge: HOME | End: 2019-12-18
Payer: MEDICARE

## 2019-12-18 PROCEDURE — 93798 PHYS/QHP OP CAR RHAB W/ECG: CPT

## 2019-12-20 ENCOUNTER — HOSPITAL ENCOUNTER (OUTPATIENT)
Dept: CARDIAC REHAB | Age: 69
Setting detail: THERAPIES SERIES
Discharge: HOME | End: 2019-12-20
Payer: MEDICARE

## 2019-12-20 PROCEDURE — 93798 PHYS/QHP OP CAR RHAB W/ECG: CPT

## 2019-12-27 ENCOUNTER — HOSPITAL ENCOUNTER (OUTPATIENT)
Dept: CARDIAC REHAB | Age: 69
Setting detail: THERAPIES SERIES
Discharge: HOME | End: 2019-12-27
Payer: MEDICARE

## 2019-12-27 PROCEDURE — 93798 PHYS/QHP OP CAR RHAB W/ECG: CPT

## 2019-12-30 ENCOUNTER — HOSPITAL ENCOUNTER (OUTPATIENT)
Dept: CARDIAC REHAB | Age: 69
Setting detail: THERAPIES SERIES
Discharge: HOME | End: 2019-12-30
Payer: MEDICARE

## 2019-12-30 PROCEDURE — 93798 PHYS/QHP OP CAR RHAB W/ECG: CPT

## 2020-01-06 ENCOUNTER — HOSPITAL ENCOUNTER (OUTPATIENT)
Dept: CARDIAC REHAB | Age: 70
Setting detail: THERAPIES SERIES
Discharge: HOME | End: 2020-01-06
Payer: MEDICARE

## 2020-01-06 PROCEDURE — 93798 PHYS/QHP OP CAR RHAB W/ECG: CPT

## 2020-01-08 ENCOUNTER — HOSPITAL ENCOUNTER (OUTPATIENT)
Dept: CARDIAC REHAB | Age: 70
Setting detail: THERAPIES SERIES
Discharge: HOME | End: 2020-01-08
Payer: MEDICARE

## 2020-01-08 PROCEDURE — 93798 PHYS/QHP OP CAR RHAB W/ECG: CPT

## 2020-01-17 ENCOUNTER — HOSPITAL ENCOUNTER (OUTPATIENT)
Dept: CARDIAC REHAB | Age: 70
Setting detail: THERAPIES SERIES
Discharge: HOME | End: 2020-01-17
Payer: MEDICARE

## 2020-01-17 PROCEDURE — 93798 PHYS/QHP OP CAR RHAB W/ECG: CPT

## 2020-03-25 PROBLEM — K80.10 CHRONIC CALCULOUS CHOLECYSTITIS: Status: RESOLVED | Noted: 2020-03-25 | Resolved: 2020-03-24

## 2020-10-23 ENCOUNTER — APPOINTMENT (OUTPATIENT)
Dept: GENERAL RADIOLOGY | Age: 70
DRG: 808 | End: 2020-10-23
Payer: MEDICARE

## 2020-10-23 ENCOUNTER — HOSPITAL ENCOUNTER (INPATIENT)
Age: 70
LOS: 4 days | Discharge: ANOTHER HOSPITAL | DRG: 808 | End: 2020-10-27
Attending: EMERGENCY MEDICINE | Admitting: HOSPITALIST
Payer: MEDICARE

## 2020-10-23 PROBLEM — A41.9 SEPSIS (HCC): Status: ACTIVE | Noted: 2020-10-23

## 2020-10-23 LAB
A/G RATIO: 0.6 (ref 1.1–2.2)
ALBUMIN SERPL-MCNC: 3 G/DL (ref 3.4–5)
ALP BLD-CCNC: 81 U/L (ref 40–129)
ALT SERPL-CCNC: 18 U/L (ref 10–40)
ANION GAP SERPL CALCULATED.3IONS-SCNC: 9 MMOL/L (ref 3–16)
AST SERPL-CCNC: 16 U/L (ref 15–37)
BASE EXCESS VENOUS: 1.6 MMOL/L (ref -3–3)
BILIRUB SERPL-MCNC: 1.4 MG/DL (ref 0–1)
BUN BLDV-MCNC: 22 MG/DL (ref 7–20)
CALCIUM SERPL-MCNC: 8.4 MG/DL (ref 8.3–10.6)
CARBOXYHEMOGLOBIN: 4.2 % (ref 0–1.5)
CHLORIDE BLD-SCNC: 95 MMOL/L (ref 99–110)
CO2: 27 MMOL/L (ref 21–32)
CREAT SERPL-MCNC: 1.1 MG/DL (ref 0.8–1.3)
EKG ATRIAL RATE: 109 BPM
EKG DIAGNOSIS: NORMAL
EKG P AXIS: 70 DEGREES
EKG P-R INTERVAL: 188 MS
EKG Q-T INTERVAL: 314 MS
EKG QRS DURATION: 82 MS
EKG QTC CALCULATION (BAZETT): 422 MS
EKG R AXIS: 15 DEGREES
EKG T AXIS: 47 DEGREES
EKG VENTRICULAR RATE: 109 BPM
GFR AFRICAN AMERICAN: >60
GFR NON-AFRICAN AMERICAN: >60
GLOBULIN: 4.7 G/DL
GLUCOSE BLD-MCNC: 291 MG/DL (ref 70–99)
HCO3 VENOUS: 26.3 MMOL/L (ref 23–29)
HCT VFR BLD CALC: 22.5 % (ref 40.5–52.5)
HEMATOLOGY PATH CONSULT: YES
HEMOGLOBIN: 7.7 G/DL (ref 13.5–17.5)
LACTIC ACID, SEPSIS: 1 MMOL/L (ref 0.4–1.9)
LACTIC ACID, SEPSIS: 2.6 MMOL/L (ref 0.4–1.9)
MCH RBC QN AUTO: 30.1 PG (ref 26–34)
MCHC RBC AUTO-ENTMCNC: 34.4 G/DL (ref 31–36)
MCV RBC AUTO: 87.5 FL (ref 80–100)
METHEMOGLOBIN VENOUS: 0.3 %
MONO TEST: NEGATIVE
O2 CONTENT, VEN: 8 VOL %
O2 SAT, VEN: 75 %
O2 THERAPY: ABNORMAL
PCO2, VEN: 41.5 MMHG (ref 40–50)
PDW BLD-RTO: 14.8 % (ref 12.4–15.4)
PH VENOUS: 7.42 (ref 7.35–7.45)
PLATELET # BLD: 9 K/UL (ref 135–450)
PLATELET SLIDE REVIEW: ABNORMAL
PMV BLD AUTO: 7.8 FL (ref 5–10.5)
PO2, VEN: 39.3 MMHG (ref 25–40)
POTASSIUM REFLEX MAGNESIUM: 4.5 MMOL/L (ref 3.5–5.1)
PRO-BNP: 1919 PG/ML (ref 0–124)
RAPID INFLUENZA  B AGN: NEGATIVE
RAPID INFLUENZA A AGN: NEGATIVE
RBC # BLD: 2.57 M/UL (ref 4.2–5.9)
S PYO AG THROAT QL: NEGATIVE
SLIDE REVIEW: ABNORMAL
SODIUM BLD-SCNC: 131 MMOL/L (ref 136–145)
TCO2 CALC VENOUS: 28 MMOL/L
TOTAL PROTEIN: 7.7 G/DL (ref 6.4–8.2)
TROPONIN: 0.04 NG/ML
WBC # BLD: 0.2 K/UL (ref 4–11)

## 2020-10-23 PROCEDURE — 87804 INFLUENZA ASSAY W/OPTIC: CPT

## 2020-10-23 PROCEDURE — 86308 HETEROPHILE ANTIBODY SCREEN: CPT

## 2020-10-23 PROCEDURE — 93010 ELECTROCARDIOGRAM REPORT: CPT | Performed by: INTERNAL MEDICINE

## 2020-10-23 PROCEDURE — 96367 TX/PROPH/DG ADDL SEQ IV INF: CPT

## 2020-10-23 PROCEDURE — U0003 INFECTIOUS AGENT DETECTION BY NUCLEIC ACID (DNA OR RNA); SEVERE ACUTE RESPIRATORY SYNDROME CORONAVIRUS 2 (SARS-COV-2) (CORONAVIRUS DISEASE [COVID-19]), AMPLIFIED PROBE TECHNIQUE, MAKING USE OF HIGH THROUGHPUT TECHNOLOGIES AS DESCRIBED BY CMS-2020-01-R: HCPCS

## 2020-10-23 PROCEDURE — 93005 ELECTROCARDIOGRAM TRACING: CPT | Performed by: PHYSICIAN ASSISTANT

## 2020-10-23 PROCEDURE — 96365 THER/PROPH/DIAG IV INF INIT: CPT

## 2020-10-23 PROCEDURE — 80053 COMPREHEN METABOLIC PANEL: CPT

## 2020-10-23 PROCEDURE — 96366 THER/PROPH/DIAG IV INF ADDON: CPT

## 2020-10-23 PROCEDURE — 2580000003 HC RX 258: Performed by: PHYSICIAN ASSISTANT

## 2020-10-23 PROCEDURE — 87077 CULTURE AEROBIC IDENTIFY: CPT

## 2020-10-23 PROCEDURE — 87880 STREP A ASSAY W/OPTIC: CPT

## 2020-10-23 PROCEDURE — 87040 BLOOD CULTURE FOR BACTERIA: CPT

## 2020-10-23 PROCEDURE — 2700000000 HC OXYGEN THERAPY PER DAY

## 2020-10-23 PROCEDURE — 99284 EMERGENCY DEPT VISIT MOD MDM: CPT

## 2020-10-23 PROCEDURE — 82803 BLOOD GASES ANY COMBINATION: CPT

## 2020-10-23 PROCEDURE — 87081 CULTURE SCREEN ONLY: CPT

## 2020-10-23 PROCEDURE — 6360000002 HC RX W HCPCS: Performed by: PHYSICIAN ASSISTANT

## 2020-10-23 PROCEDURE — 94761 N-INVAS EAR/PLS OXIMETRY MLT: CPT

## 2020-10-23 PROCEDURE — 85025 COMPLETE CBC W/AUTO DIFF WBC: CPT

## 2020-10-23 PROCEDURE — 2000000000 HC ICU R&B

## 2020-10-23 PROCEDURE — 6370000000 HC RX 637 (ALT 250 FOR IP): Performed by: PHYSICIAN ASSISTANT

## 2020-10-23 PROCEDURE — 84484 ASSAY OF TROPONIN QUANT: CPT

## 2020-10-23 PROCEDURE — 96361 HYDRATE IV INFUSION ADD-ON: CPT

## 2020-10-23 PROCEDURE — 83605 ASSAY OF LACTIC ACID: CPT

## 2020-10-23 PROCEDURE — 96375 TX/PRO/DX INJ NEW DRUG ADDON: CPT

## 2020-10-23 PROCEDURE — 83880 ASSAY OF NATRIURETIC PEPTIDE: CPT

## 2020-10-23 PROCEDURE — 87254 VIRUS INOCULATION SHELL VIA: CPT

## 2020-10-23 PROCEDURE — 71046 X-RAY EXAM CHEST 2 VIEWS: CPT

## 2020-10-23 RX ORDER — VITAMIN E 268 MG
400 CAPSULE ORAL DAILY
Status: DISCONTINUED | OUTPATIENT
Start: 2020-10-24 | End: 2020-10-27 | Stop reason: HOSPADM

## 2020-10-23 RX ORDER — DEXTROSE MONOHYDRATE 50 MG/ML
100 INJECTION, SOLUTION INTRAVENOUS PRN
Status: DISCONTINUED | OUTPATIENT
Start: 2020-10-23 | End: 2020-10-27 | Stop reason: HOSPADM

## 2020-10-23 RX ORDER — DEXAMETHASONE SODIUM PHOSPHATE 10 MG/ML
10 INJECTION, SOLUTION INTRAMUSCULAR; INTRAVENOUS ONCE
Status: COMPLETED | OUTPATIENT
Start: 2020-10-23 | End: 2020-10-23

## 2020-10-23 RX ORDER — 0.9 % SODIUM CHLORIDE 0.9 %
30 INTRAVENOUS SOLUTION INTRAVENOUS ONCE
Status: COMPLETED | OUTPATIENT
Start: 2020-10-23 | End: 2020-10-23

## 2020-10-23 RX ORDER — DEXTROSE MONOHYDRATE 25 G/50ML
12.5 INJECTION, SOLUTION INTRAVENOUS PRN
Status: DISCONTINUED | OUTPATIENT
Start: 2020-10-23 | End: 2020-10-27 | Stop reason: HOSPADM

## 2020-10-23 RX ORDER — 0.9 % SODIUM CHLORIDE 0.9 %
1000 INTRAVENOUS SOLUTION INTRAVENOUS ONCE
Status: DISCONTINUED | OUTPATIENT
Start: 2020-10-23 | End: 2020-10-23

## 2020-10-23 RX ORDER — PANTOPRAZOLE SODIUM 40 MG/1
40 TABLET, DELAYED RELEASE ORAL DAILY
Status: DISCONTINUED | OUTPATIENT
Start: 2020-10-24 | End: 2020-10-27 | Stop reason: HOSPADM

## 2020-10-23 RX ORDER — NICOTINE POLACRILEX 4 MG
15 LOZENGE BUCCAL PRN
Status: DISCONTINUED | OUTPATIENT
Start: 2020-10-23 | End: 2020-10-27 | Stop reason: HOSPADM

## 2020-10-23 RX ORDER — VITAMIN B COMPLEX
1000 TABLET ORAL DAILY
Status: DISCONTINUED | OUTPATIENT
Start: 2020-10-24 | End: 2020-10-27 | Stop reason: HOSPADM

## 2020-10-23 RX ORDER — ACETAMINOPHEN 500 MG
1000 TABLET ORAL ONCE
Status: COMPLETED | OUTPATIENT
Start: 2020-10-23 | End: 2020-10-23

## 2020-10-23 RX ADMIN — DEXAMETHASONE SODIUM PHOSPHATE 10 MG: 10 INJECTION, SOLUTION INTRAMUSCULAR; INTRAVENOUS at 17:30

## 2020-10-23 RX ADMIN — VANCOMYCIN HYDROCHLORIDE 2000 MG: 10 INJECTION, POWDER, LYOPHILIZED, FOR SOLUTION INTRAVENOUS at 18:41

## 2020-10-23 RX ADMIN — ACETAMINOPHEN 1000 MG: 500 TABLET ORAL at 17:30

## 2020-10-23 RX ADMIN — SODIUM CHLORIDE 2382 ML: 9 INJECTION, SOLUTION INTRAVENOUS at 17:30

## 2020-10-23 RX ADMIN — CEFEPIME 2 G: 2 INJECTION, POWDER, FOR SOLUTION INTRAVENOUS at 17:30

## 2020-10-23 RX ADMIN — Medication 5 ML: at 18:11

## 2020-10-23 ASSESSMENT — PAIN SCALES - GENERAL: PAINLEVEL_OUTOF10: 7

## 2020-10-23 ASSESSMENT — PAIN DESCRIPTION - LOCATION: LOCATION: THROAT

## 2020-10-23 NOTE — ED PROVIDER NOTES
[facility] ICU  EMERGENCY DEPARTMENT ENCOUNTER        Pt Name: Karley Stewart  MRN: [id number]  [place] 1950  Date of evaluation: 10/23/2020  Provider: MAYELIN Holden  PCP: No primary care provider on file. This patient was seen and evaluated by the attending physician Cliff Caal       Chief Complaint   Patient presents with    Pharyngitis     x 3 days unable to swallow. Pt is on chemo        HISTORY OF PRESENT ILLNESS   (Location/Symptom, Timing/Onset, Context/Setting, Quality, Duration, Modifying Factors, Severity)  Note limiting factors. Karley Stewart is a 65 y.o. male with hx Myelodysplastic syndrome currently under the care of Dr. Josefa Hedrick at [facility] who presents via private vehicle from his home for pharyngitis. Onset was 3 days ago. Duration has been since the onset. Context includes he started with cough runny nose nasal congestion sore throat. He endorses mild intermittent headaches, denies worst headache of his life or persistent headache. He denies headache currently. He denies neck pain. He denies fevers but endorses generalized body aches, no chills. He is currently on chemo. He has been spitting up mucus he feels like he cannot swallow. He denies any chest pain or difficulty breathing. He denies any nausea vomiting abdominal pain no dysuria or hematuria. He endorses generalized malaise. He denies any recent sick contacts but he is in the normal hospital frequently getting weekly blood transfusions. Last CBC 10/21:  WBC 0.7  RBC 2.65  Hg 7.8  Hct 23.3  Plt 25    Nursing Notes were all reviewed and agreed with or any disagreements were addressed  in the HPI. REVIEW OF SYSTEMS    (2-9 systems for level 4, 10 or more for level 5)     Review of Systems    Positives and Pertinent negatives as per HPI. Except as noted abovein the ROS, all other systems were reviewed and negative.        PAST MEDICAL HISTORY     Past Medical History:   Diagnosis Sulfa antibiotics and Pcn [penicillins]    FAMILYHISTORY     History reviewed. No pertinent family history.        SOCIAL HISTORY       Social History     Socioeconomic History    Marital status: Single     Spouse name: None    Number of children: None    Years of education: None    Highest education level: None   Occupational History    None   Social Needs    Financial resource strain: None    Food insecurity     Worry: None     Inability: None    Transportation needs     Medical: None     Non-medical: None   Tobacco Use    Smoking status: Former Smoker     Packs/day: 1.00     Years: 45.00     Pack years: 45.00     Types: Cigarettes     Start date: 10/23/1975     Last attempt to quit: 10/23/2019     Years since quittin.0    Smokeless tobacco: Never Used   Substance and Sexual Activity    Alcohol use: No     Alcohol/week: 2.0 standard drinks     Types: 2 Cans of beer per week     Comment: occasionally couple times a month    Drug use: No    Sexual activity: Not Currently   Lifestyle    Physical activity     Days per week: None     Minutes per session: None    Stress: None   Relationships    Social connections     Talks on phone: None     Gets together: None     Attends Taoism service: None     Active member of club or organization: None     Attends meetings of clubs or organizations: None     Relationship status: None    Intimate partner violence     Fear of current or ex partner: None     Emotionally abused: None     Physically abused: None     Forced sexual activity: None   Other Topics Concern    None   Social History Narrative    None       SCREENINGS    Sai Coma Scale  Eye Opening: Spontaneous  Best Verbal Response: Oriented  Best Motor Response: Obeys commands  Sai Coma Scale Score: 15        PHYSICAL EXAM    (up to 7 for level 4, 8 or more for level 5)     ED Triage Vitals [10/23/20 1541]   BP Temp Temp Source Pulse Resp SpO2 Height Weight   130/66 98.5 °F (36.9 °C) Oral 114 20 95 % 6' (1.829 m) 175 lb (79.4 kg)       Physical Exam  Vitals signs and nursing note reviewed. Constitutional:       General: He is awake. He is not in acute distress. Appearance: He is well-developed and normal weight. He is ill-appearing (Chronically). He is not toxic-appearing or diaphoretic. HENT:      Head: Normocephalic and atraumatic. Jaw: There is normal jaw occlusion. Salivary Glands: Right salivary gland is not diffusely enlarged or tender. Left salivary gland is not diffusely enlarged or tender. Right Ear: Hearing, tympanic membrane, ear canal and external ear normal.      Left Ear: Hearing, tympanic membrane, ear canal and external ear normal.      Nose: Nose normal. No congestion or rhinorrhea. Mouth/Throat:      Lips: Pink. No lesions. Mouth: Mucous membranes are dry. No angioedema. Tongue: No lesions. Palate: Lesions present. Pharynx: Oropharyngeal exudate and posterior oropharyngeal erythema present. Tonsils: Tonsillar exudate present. No tonsillar abscesses. Comments: White exudate noted  Tolerating oral secretions but is spitting up and coughing up clear colorless mucous   Eyes:      General: No scleral icterus. Right eye: No discharge. Left eye: No discharge. Extraocular Movements: Extraocular movements intact. Conjunctiva/sclera: Conjunctivae normal.      Pupils: Pupils are equal, round, and reactive to light. Neck:      Musculoskeletal: Normal range of motion and neck supple. No neck rigidity or muscular tenderness. Comments: No meningismus   Cardiovascular:      Rate and Rhythm: Regular rhythm. Tachycardia present. Heart sounds: Normal heart sounds. No murmur. No friction rub. No gallop. Pulmonary:      Effort: Pulmonary effort is normal. Tachypnea present. No bradypnea or respiratory distress. Breath sounds: Transmitted upper airway sounds present. No stridor. Rhonchi present.  No wheezing Troponin 0.04 (*)     All other components within normal limits    Narrative:     Performed at:  [facility] [address],  [place]   Phone [phone]   LACTATE, SEPSIS - Abnormal; Notable for the following components:    Lactic Acid, Sepsis 2.6 (*)     All other components within normal limits    Narrative:     Performed at:  [facility] [address],  [place]   Phone [phone]   BLOOD GAS, VENOUS - Abnormal; Notable for the following components:    Carboxyhemoglobin 4.2 (*)     All other components within normal limits    Narrative:     Performed at:  [place] [address],  [place]   Phone [phone]   BRAIN NATRIURETIC PEPTIDE - Abnormal; Notable for the following components:    Pro-BNP 1,919 (*)     All other components within normal limits    Narrative:     Performed at:  [facility] [address],  [place]   Phone [phone]   URINE RT REFLEX TO CULTURE - Abnormal; Notable for the following components:    Glucose, Ur >=1000 (*)     Blood, Urine MODERATE (*)     Protein, UA 30 (*)     Urobilinogen, Urine 2.0 (*)     All other components within normal limits    Narrative:     Performed at:  [facility] [address],  [place]   Phone [phone]   MICROSCOPIC URINALYSIS - Abnormal; Notable for the following components:    Mucus, UA 1+ (*)     RBC, UA 11-20 (*)     Bacteria, UA Rare (*)     All other components within normal limits    Narrative:     Performed at:  [facility] [address],  [place]   Phone [phone]   POCT GLUCOSE - Abnormal; Notable for the following components:    POC Glucose 332 (*)     All other components within normal limits    Narrative:     Performed at:  [facility] [address],  [place]   Phone [phone]   STREP SCREEN GROUP A THROAT    Narrative:     Performed at:  [facility] [address],  [place], [facility]   Phone [phone]   RAPID INFLUENZA A/B ANTIGENS    Narrative:     Performed at:  [facility] [address],  [place], [place]   Phone [phone]   CULTURE, BLOOD 1   CULTURE, CMV   CULTURE, BLOOD 2   CULTURE, BETA STREP CONFIRM PLATES   CULTURE, BLOOD 1   LACTATE, SEPSIS    Narrative:     Performed at:  [place] [address],  [place], [place]   Phone [phone]   MONONUCLEOSIS SCREEN    Narrative:     Performed at:  [facility]) [facility] [address],  [place], [place]   Phone [phone]   LACTIC ACID, PLASMA    Narrative:     Collection has been rescheduled by Violeta Villafuerte at 10/23/2020 23:32 Reason: Pt   not in room  Performed at:  [facility] [address],  [place], [place]   Phone [phone]   TROPONIN   HEMOGLOBIN A1C   LACTIC ACID, PLASMA   POCT GLUCOSE   POCT GLUCOSE       All other labs were within normal range or not returned as of this dictation. EKG: All EKG's are interpreted by the Emergency Department Physician who either signs orCo-signs this chart in the absence of a cardiologist.  Please see their note for interpretation of EKG. RADIOLOGY:   Non-plain film images such as CT, Ultrasound and MRI are read by the radiologist. Anamaria Mauro radiographic images are visualized andpreliminarily interpreted by the  ED Provider with the below findings:        Interpretation perthe Radiologist below, if available at the time of this note:    XR CHEST (2 VW)   Final Result   No acute cardiopulmonary disease. No results found.        PROCEDURES   Unless otherwise 0.9 % sodium chloride IV bolus 2,382 mL (0 mL/kg × 79.4 kg Intravenous Stopped 10/23/20 2052)   cefepime (MAXIPIME) 2 g IVPB minibag (0 g Intravenous Stopped 10/23/20 1800)   vancomycin (VANCOCIN) 2,000 mg in dextrose 5 % 500 mL IVPB (0 mg Intravenous Stopped 10/24/20 0000)   Magic Mouthwash (Miracle Mouthwash) 5 mL (5 mLs Swish & Spit Given 10/23/20 1811)       PDMP Monitoring:    Last PDMP Sergey as Reviewed [facility]):  Review User Review Instant Review Result            Urine Drug Screenings (1 yr)     Drug screen multi urine  Collected: 7/10/2013  2:31 AM (Final result)    Complete Results              Medication Contract and Consent for Opioid Use Documents Filed      No documents found                MDM:   Patient seen and evaluated. Old records reviewed. Diagnostic testing reviewed and results discussed. Patient is a 68-year-old male who presents for evaluation of pharyngitis. On exam he is alert oriented afebrile well-perfused, he is on initial evaluation in the ER tachycardic and mildly tachypneic. He is meeting sirs sepsis criteria. Labs reviewed, he is severely neutropenic with neutropenic fever. IV fluids and broad-spectrum antibiotics initiated. Oropharynx is remarkable for exudates. No evidence of deep neck space infection. Respirations are rhonchorous. He is coughing up clear colorless sputum. His abdomen is soft nontender without peritoneal signs. He has no meningismus I have low concern for acute meningitis at this time. No focal neuro deficits. Patient was given antipyretic in the emergency department with improvement. He was given IV fluids and maintain adequate MAP. Rapid flu obtained, negative. Mono negative. Would like to test for CMV. Rapid strep is negative culture pending. Differentials include chemotherapy induced mucositis, CMV, other viral infection.   Patient is without other source of infection at this time urinalysis is negative, chest x-ray does not show any sign of acute pneumonia however given his cough this remains on the differential.  Covid testing obtained. Patient does have a mildly elevated troponin, I believe that this is likely demand ischemia secondary to his severe anemia, will trend. ED Course as of Oct 24 0232   Sat Oct 24, 2020   9614 The Ekg interpreted by me shows  sinus tachycardia, bxbj=145   Axis is   Normal  QTc is  normal  Intervals and Durations are unremarkable. ST Segments: normal          [MP]      ED Course User Index  [MP] Isaias Callawaying, DO       SEP-1 CORE MEASURE DATA    Classification: sepsis    Amount of fluids ordered: at least 30mL/kg based on entered actual weight at time of triage    Time at which sepsis was identified: 345p    Broad-spectrum antibiotics chosen:  based on suspected source of: Unknown    Repeat lactate level: improving    On reassessment after fluid resuscitation:   I have reassessed tissue perfusion and hemodynamic status after fluid bolus      At this time I am advocating the patient be admitted to the ICU for continued monitoring mount evaluation and treatment of his acute condition. All information including ED workup, results, treatment, diagnosis has been reviewed and discussed with ED attending physician and directly discussed with Hospitalist who is the admitting physician. Pt will be admitted in stable condition. Pt advised of admission and is in full agreement. FINAL IMPRESSION      1. Septicemia ([address]    2. Neutropenia, unspecified type ([address]    3. Pharyngitis, unspecified etiology          DISPOSITION/PLAN   DISPOSITION Admitted 10/23/2020 08:24:53 PM      PATIENT REFERREDTO:  No follow-up provider specified.     DISCHARGE MEDICATIONS:  Current Discharge Medication List          DISCONTINUED MEDICATIONS:  Current Discharge Medication List      STOP taking these medications       aspirin 81 MG tablet Comments:   Reason for Stopping:         Omega-3 Fatty Acids (FISH OIL) 1000 MG CAPS Comments: Reason for Stopping:                      (Please note that portions ofthis note were completed with a voice recognition program.  Efforts were made to edit the dictations but occasionally words are mis-transcribed.)    Sonu Patel (electronically signed)        Sonu Patel  10/24/20 9612

## 2020-10-23 NOTE — ED NOTES
Per Gustavus Scheuermann PA, verbal order to discontinue order for original order of 1000 ml of NS bolus, changed to sepsis protocol.      Adalberto Kimball RN  10/23/20 0124

## 2020-10-24 PROBLEM — D46.9 MYELODYSPLASTIC SYNDROME (HCC): Status: ACTIVE | Noted: 2020-10-24

## 2020-10-24 PROBLEM — R50.81 NEUTROPENIC FEVER (HCC): Status: ACTIVE | Noted: 2020-10-24

## 2020-10-24 PROBLEM — C92.00 AML (ACUTE MYELOBLASTIC LEUKEMIA) (HCC): Status: ACTIVE | Noted: 2020-10-24

## 2020-10-24 PROBLEM — D70.9 NEUTROPENIC FEVER (HCC): Status: ACTIVE | Noted: 2020-10-24

## 2020-10-24 PROBLEM — D61.818 PANCYTOPENIA (HCC): Status: ACTIVE | Noted: 2020-10-24

## 2020-10-24 LAB
ABO/RH: NORMAL
AMORPHOUS: ABNORMAL /HPF
ANION GAP SERPL CALCULATED.3IONS-SCNC: 6 MMOL/L (ref 3–16)
ANTIBODY SCREEN: NORMAL
BACTERIA: ABNORMAL /HPF
BILIRUBIN URINE: NEGATIVE
BLOOD BANK DISPENSE STATUS: NORMAL
BLOOD BANK PRODUCT CODE: NORMAL
BLOOD, URINE: ABNORMAL
BPU ID: NORMAL
BUN BLDV-MCNC: 23 MG/DL (ref 7–20)
CALCIUM SERPL-MCNC: 8 MG/DL (ref 8.3–10.6)
CHLORIDE BLD-SCNC: 102 MMOL/L (ref 99–110)
CLARITY: CLEAR
CO2: 25 MMOL/L (ref 21–32)
COLOR: YELLOW
CREAT SERPL-MCNC: 0.7 MG/DL (ref 0.8–1.3)
DESCRIPTION BLOOD BANK: NORMAL
EPITHELIAL CELLS, UA: ABNORMAL /HPF (ref 0–5)
GFR AFRICAN AMERICAN: >60
GFR NON-AFRICAN AMERICAN: >60
GLUCOSE BLD-MCNC: 157 MG/DL (ref 70–99)
GLUCOSE BLD-MCNC: 177 MG/DL (ref 70–99)
GLUCOSE BLD-MCNC: 233 MG/DL (ref 70–99)
GLUCOSE BLD-MCNC: 307 MG/DL (ref 70–99)
GLUCOSE BLD-MCNC: 332 MG/DL (ref 70–99)
GLUCOSE URINE: >=1000 MG/DL
HCT VFR BLD CALC: 21.4 % (ref 40.5–52.5)
HEMOGLOBIN: 7.3 G/DL (ref 13.5–17.5)
HYALINE CASTS: ABNORMAL /LPF (ref 0–2)
KETONES, URINE: NEGATIVE MG/DL
LACTIC ACID: 1.6 MMOL/L (ref 0.4–2)
LACTIC ACID: 1.8 MMOL/L (ref 0.4–2)
LEUKOCYTE ESTERASE, URINE: NEGATIVE
MCH RBC QN AUTO: 30.2 PG (ref 26–34)
MCHC RBC AUTO-ENTMCNC: 34.2 G/DL (ref 31–36)
MCV RBC AUTO: 88.4 FL (ref 80–100)
MICROSCOPIC EXAMINATION: YES
MUCUS: ABNORMAL /LPF
NITRITE, URINE: NEGATIVE
PDW BLD-RTO: 14.5 % (ref 12.4–15.4)
PERFORMED ON: ABNORMAL
PH UA: 6 (ref 5–8)
PLATELET # BLD: 5 K/UL (ref 135–450)
PMV BLD AUTO: 9 FL (ref 5–10.5)
POTASSIUM SERPL-SCNC: 4.4 MMOL/L (ref 3.5–5.1)
PROTEIN UA: 30 MG/DL
RBC # BLD: 2.42 M/UL (ref 4.2–5.9)
RBC UA: ABNORMAL /HPF (ref 0–4)
SODIUM BLD-SCNC: 133 MMOL/L (ref 136–145)
SPECIFIC GRAVITY UA: 1.01 (ref 1–1.03)
TROPONIN: 0.02 NG/ML
URINE REFLEX TO CULTURE: ABNORMAL
URINE TYPE: ABNORMAL
UROBILINOGEN, URINE: 2 E.U./DL
WBC # BLD: 0.1 K/UL (ref 4–11)
WBC UA: ABNORMAL /HPF (ref 0–5)

## 2020-10-24 PROCEDURE — 6370000000 HC RX 637 (ALT 250 FOR IP): Performed by: HOSPITALIST

## 2020-10-24 PROCEDURE — 1200000000 HC SEMI PRIVATE

## 2020-10-24 PROCEDURE — 36430 TRANSFUSION BLD/BLD COMPNT: CPT

## 2020-10-24 PROCEDURE — 81001 URINALYSIS AUTO W/SCOPE: CPT

## 2020-10-24 PROCEDURE — P9016 RBC LEUKOCYTES REDUCED: HCPCS

## 2020-10-24 PROCEDURE — 86850 RBC ANTIBODY SCREEN: CPT

## 2020-10-24 PROCEDURE — 6360000002 HC RX W HCPCS: Performed by: INTERNAL MEDICINE

## 2020-10-24 PROCEDURE — 2580000003 HC RX 258: Performed by: HOSPITALIST

## 2020-10-24 PROCEDURE — P9035 PLATELET PHERES LEUKOREDUCED: HCPCS

## 2020-10-24 PROCEDURE — 6370000000 HC RX 637 (ALT 250 FOR IP): Performed by: INTERNAL MEDICINE

## 2020-10-24 PROCEDURE — 80048 BASIC METABOLIC PNL TOTAL CA: CPT

## 2020-10-24 PROCEDURE — 6360000002 HC RX W HCPCS: Performed by: HOSPITALIST

## 2020-10-24 PROCEDURE — 83036 HEMOGLOBIN GLYCOSYLATED A1C: CPT

## 2020-10-24 PROCEDURE — 83605 ASSAY OF LACTIC ACID: CPT

## 2020-10-24 PROCEDURE — P9037 PLATE PHERES LEUKOREDU IRRAD: HCPCS

## 2020-10-24 PROCEDURE — 36415 COLL VENOUS BLD VENIPUNCTURE: CPT

## 2020-10-24 PROCEDURE — 2580000003 HC RX 258

## 2020-10-24 PROCEDURE — 86923 COMPATIBILITY TEST ELECTRIC: CPT

## 2020-10-24 PROCEDURE — 99223 1ST HOSP IP/OBS HIGH 75: CPT | Performed by: INTERNAL MEDICINE

## 2020-10-24 PROCEDURE — 99233 SBSQ HOSP IP/OBS HIGH 50: CPT | Performed by: INTERNAL MEDICINE

## 2020-10-24 PROCEDURE — 86900 BLOOD TYPING SEROLOGIC ABO: CPT

## 2020-10-24 PROCEDURE — 2580000003 HC RX 258: Performed by: INTERNAL MEDICINE

## 2020-10-24 PROCEDURE — 85025 COMPLETE CBC W/AUTO DIFF WBC: CPT

## 2020-10-24 PROCEDURE — 87040 BLOOD CULTURE FOR BACTERIA: CPT

## 2020-10-24 PROCEDURE — 86901 BLOOD TYPING SEROLOGIC RH(D): CPT

## 2020-10-24 RX ORDER — 0.9 % SODIUM CHLORIDE 0.9 %
250 INTRAVENOUS SOLUTION INTRAVENOUS ONCE
Status: COMPLETED | OUTPATIENT
Start: 2020-10-24 | End: 2020-10-25

## 2020-10-24 RX ORDER — ACETAMINOPHEN 650 MG/1
650 SUPPOSITORY RECTAL EVERY 6 HOURS PRN
Status: DISCONTINUED | OUTPATIENT
Start: 2020-10-24 | End: 2020-10-27 | Stop reason: HOSPADM

## 2020-10-24 RX ORDER — ACETAMINOPHEN 650 MG/1
650 SUPPOSITORY RECTAL EVERY 4 HOURS PRN
Status: DISCONTINUED | OUTPATIENT
Start: 2020-10-24 | End: 2020-10-24

## 2020-10-24 RX ORDER — SODIUM CHLORIDE 9 MG/ML
INJECTION, SOLUTION INTRAVENOUS
Status: COMPLETED
Start: 2020-10-24 | End: 2020-10-24

## 2020-10-24 RX ORDER — SODIUM CHLORIDE 9 MG/ML
INJECTION, SOLUTION INTRAVENOUS
Status: DISPENSED
Start: 2020-10-24 | End: 2020-10-24

## 2020-10-24 RX ADMIN — SODIUM CHLORIDE 250 ML: 9 INJECTION, SOLUTION INTRAVENOUS at 05:31

## 2020-10-24 RX ADMIN — PANTOPRAZOLE SODIUM 40 MG: 40 TABLET, DELAYED RELEASE ORAL at 08:33

## 2020-10-24 RX ADMIN — ACETAMINOPHEN 650 MG: 650 SUPPOSITORY RECTAL at 22:42

## 2020-10-24 RX ADMIN — CEFEPIME 2 G: 2 INJECTION, POWDER, FOR SOLUTION INTRAVENOUS at 05:29

## 2020-10-24 RX ADMIN — CEFEPIME 2 G: 2 INJECTION, POWDER, FOR SOLUTION INTRAVENOUS at 17:38

## 2020-10-24 RX ADMIN — INSULIN LISPRO 1 UNITS: 100 INJECTION, SOLUTION INTRAVENOUS; SUBCUTANEOUS at 22:18

## 2020-10-24 RX ADMIN — VANCOMYCIN HYDROCHLORIDE 1250 MG: 1 INJECTION, POWDER, LYOPHILIZED, FOR SOLUTION INTRAVENOUS at 21:40

## 2020-10-24 RX ADMIN — VANCOMYCIN HYDROCHLORIDE 1250 MG: 1 INJECTION, POWDER, LYOPHILIZED, FOR SOLUTION INTRAVENOUS at 08:33

## 2020-10-24 RX ADMIN — NYSTATIN 5 ML: 500000 SUSPENSION ORAL at 11:07

## 2020-10-24 RX ADMIN — INSULIN LISPRO 2 UNITS: 100 INJECTION, SOLUTION INTRAVENOUS; SUBCUTANEOUS at 00:37

## 2020-10-24 ASSESSMENT — PAIN SCALES - GENERAL
PAINLEVEL_OUTOF10: 0
PAINLEVEL_OUTOF10: 0

## 2020-10-24 NOTE — PROGRESS NOTES
Report given to Tiana Echols, in preparation for 2 west transfer. Pt continues on 2 liters nasal canula, still congested with issues swallowing.  VSS, telemetry SR.

## 2020-10-24 NOTE — PROGRESS NOTES
Pt attempted to swallow water and choked on second swallow causing pt to cough and was productive of large amount of mucous.

## 2020-10-24 NOTE — PROGRESS NOTES
4 Eyes Skin Assessment     The patient is being assess for Admission      I agree that 2 RN's have performed a thorough Head to Toe Skin Assessment on the patient. ALL assessment sites listed below have been assessed. Areas assessed by both nurses:   [x]   Head, Face, and Ears   [x]   Shoulders, Back, and Chest, Abdomen  [x]   Arms, Elbows, and Hands   [x]   Coccyx, Sacrum, and Ischium  [x]   Legs, Feet, and Heels        No issues identified    **SHARE this note so that the co-signing nurse is able to place an eSignature**    Co-signer eSignature: Electronically signed by Thang Bond RN on 10/24/20 at 3:51 AM EDT    Does the Patient have Skin Breakdown? No          Darci Prevention initiated:  Yes   Wound Care Orders initiated:  No      [facility] nurse consulted for Pressure Injury (Stage 3,4, Unstageable, DTI, NWPT, Complex wounds)and New or Established Ostomies:  No      Primary Nurse eSignature: Electronically signed by Aleksey Escobar RN on 10/24/20 at 3:40 AM EDT      Patient is able to demonstrated the ability to move from a reclining position to an upright position within the recliner.

## 2020-10-24 NOTE — CONSULTS
level: Not on file   Occupational History    Not on file   Social Needs    Financial resource strain: Not on file    Food insecurity     Worry: Not on file     Inability: Not on file    Transportation needs     Medical: Not on file     Non-medical: Not on file   Tobacco Use    Smoking status: Former Smoker     Packs/day: 1.00     Years: 45.00     Pack years: 45.00     Types: Cigarettes     Start date: 10/23/1975     Last attempt to quit: 10/23/2019     Years since quittin.0    Smokeless tobacco: Never Used   Substance and Sexual Activity    Alcohol use: No     Alcohol/week: 2.0 standard drinks     Types: 2 Cans of beer per week     Comment: occasionally couple times a month    Drug use: No    Sexual activity: Not Currently   Lifestyle    Physical activity     Days per week: Not on file     Minutes per session: Not on file    Stress: Not on file   Relationships    Social connections     Talks on phone: Not on file     Gets together: Not on file     Attends [employer]ian service: Not on file     Active member of club or organization: Not on file     Attends meetings of clubs or organizations: Not on file     Relationship status: Not on file    Intimate partner violence     Fear of current or ex partner: Not on file     Emotionally abused: Not on file     Physically abused: Not on file     Forced sexual activity: Not on file   Other Topics Concern    Not on file   Social History Narrative    Not on file       FAMILY HISTORY:     History reviewed. No pertinent family history. ALLERGIES:     Allergies as of 10/23/2020 - Review Complete 10/23/2020   Allergen Reaction Noted    Sulfa antibiotics Hives 2018    Pcn [penicillins] Hives 2015       MEDICATIONS:     No current facility-administered medications on file prior to encounter.       Current Outpatient Medications on File Prior to Encounter   Medication Sig Dispense Refill    INSULIN ASPART SC Inject into the skin Sliding scale coverage as well as basal dose of 4 units with each meal      NONFORMULARY Chemo by pill, infusion, and injection in abdomen  Needs follow up with MD for clarification      insulin aspart (NOVOLOG) 100 UNIT/ML injection vial Inject 4 Units into the skin 3 times daily (before meals)      vitamin E 400 UNIT capsule Take 400 Units by mouth daily      pantoprazole (PROTONIX) 40 MG tablet Take 40 mg by mouth daily      b complex vitamins capsule Take 1 capsule by mouth daily      atorvastatin (LIPITOR) 80 MG tablet Take 80 mg by mouth daily       vitamin D (CHOLECALCIFEROL) 1000 UNIT TABS tablet Take 1,000 Units by mouth daily       REVIEW OF SYSTEMS:       10 point ROS completed. Pertinent positives in HPI, otherwise negative. PHYSICAL EXAM:       Vitals:    10/24/20 0700   BP: 125/65   Pulse: 88   Resp: 15   Temp:    SpO2: 99%       General appearance: alert and cooperative  Head: Normocephalic, without obvious abnormality, atraumatic  Neck: No palpable lymphadenopathy in supraclavicular or cervical chains  Lungs: Clear to auscultation bilaterally, no audible rales, wheezes or crackles  Heart: Regular rate and rhythm, S1, S2 normal  Abdomen: Soft, non-tender; bowel sounds normal; no masses,  no organomegaly  Extremities: without cyanosis, clubbing, edema or asymmetry  Skin: No jaundice, purpura or petechiae      LABS:     Lab Results   Component Value Date    WBC 0.2 (LL) 10/23/2020    HGB 7.7 (L) 10/23/2020    HCT 22.5 (L) 10/23/2020    MCV 87.5 10/23/2020    PLT 9 (LL) 10/23/2020       Lab Results   Component Value Date    GLUCOSE 291 (H) 10/23/2020    BUN 22 (H) 10/23/2020    CREATININE 1.1 10/23/2020    K 4.5 10/23/2020       Lab Results   Component Value Date    ALKPHOS 81 10/23/2020    ALT 18 10/23/2020    AST 16 10/23/2020    BILITOT 1.4 (H) 10/23/2020    BILIDIR <0.2 08/16/2016    PROT 7.7 10/23/2020       IMAGING:     Xr Chest (2 Vw)  Result Date: 10/23/2020  No acute cardiopulmonary disease.

## 2020-10-24 NOTE — PROGRESS NOTES
Internal Medicine ICU Progress Note      Events of Last 24 hours:     Patient admitted to hospital with a sore throat and trouble swallowing. He has a history of myeloproliferative disorder and possibly aplastic anemia. He sees hematology oncology from [facility].  He came to the hospital complains of his throat bothering him. 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 Bone marrow biopsy was done on 2020. Patient is requiring frequent blood and platelet transfusion. He is under any Venetoclax. Had a low-grade fever. He is a Covid rule out. Invasive Lines: PIV        MV:  n/a    No results for input(s): PHART, JLC0YRE, PO2ART in the last 72 hours. MV Settings:     / / /     IV:   dextrose         Vitals:  Temp  Av.6 °F (37 °C)  Min: 97.1 °F (36.2 °C)  Max: 100.4 °F (38 °C)  Pulse  Av.3  Min: 73  Max: 114  BP  Min: 99/48  Max: 131/59  SpO2  Av.3 %  Min: 90 %  Max: 100 %  No data found. CVP:        Intake/Output Summary (Last 24 hours) at 10/24/2020 1151  Last data filed at 10/24/2020 0531  Gross per 24 hour   Intake 25604.8 ml   Output 475 ml   Net 58624.8 ml       EXAM:  General: Elderly white male who appears sick. Awake and alert. Eyes: PERRL. No sclera icterus. No conjunctiva injected. ENT: No discharge. Pharynx clear. Palpable tenderness of his neck. Neck: Trachea midline. Normal thyroid. Resp: No accessory muscle use. No crackles. No wheezing. No rhonchi. No dullness on percussion. CV: Regular rate. Regular rhythm. No mumur or rub. No edema. No JVD. Palpable pedal pulses. GI: Non-tender. Non-distended. No masses. No organmegaly. Normal bowel sounds. No hernia. Skin: Warm and dry. No nodule on exposed extremities. No rash on exposed extremities. Lymph: No cervical LAD. No supraclavicular LAD. M/S: No cyanosis. No joint deformity. No clubbing. Neuro: Awake. Follows commands.  Positive pupils/gag/corneals. Normal pain response. Psych: Oriented to person, place, time. No anxiety or agitation. Medications:  Scheduled Meds:   vancomycin  1,250 mg Intravenous Q12H    0.9 % sodium chloride  250 mL Intravenous Once    cefepime  2 g Intravenous Q12H    pantoprazole  40 mg Oral Daily    Vitamin D  1,000 Units Oral Daily    vitamin E  400 Units Oral Daily    insulin lispro  0-6 Units Subcutaneous TID WC    insulin lispro  0-3 Units Subcutaneous Nightly       PRN Meds:  magic (miracle) mouthwash with nystatin, glucose, dextrose, glucagon (rDNA), dextrose    Results:  CBC:   Recent Labs     10/23/20  1545 10/24/20  0659   WBC 0.2* 0.1*   HGB 7.7* 7.3*   HCT 22.5* 21.4*   MCV 87.5 88.4   PLT 9* 5*     BMP:   Recent Labs     10/23/20  1545 10/24/20  0659   * 133*   K 4.5 4.4   CL 95* 102   CO2 27 25   BUN 22* 23*   CREATININE 1.1 0.7*     LIVER PROFILE:   Recent Labs     10/23/20  1545   AST 16   ALT 18   BILITOT 1.4*   ALKPHOS 81     PT/INR: No results for input(s): PROTIME, INR in the last 72 hours. APTT: No results for input(s): APTT in the last 72 hours. UA:  Recent Labs     10/24/20  0030   COLORU Yellow   PHUR 6.0   WBCUA 3-5   RBCUA 11-20*   MUCUS 1+*   BACTERIA Rare*   CLARITYU Clear   SPECGRAV 1.015   LEUKOCYTESUR Negative   UROBILINOGEN 2.0*   BILIRUBINUR Negative   BLOODU MODERATE*   GLUCOSEU >=1000*   AMORPHOUS 1+       Cultures:  Pending. Films:    XR CHEST (2 VW)   Final Result   No acute cardiopulmonary disease. Assessment:    Principal Problem:    Neutropenic fever ([address]  Active Problems:    Hypertension    Diabetes mellitus ([address]    CAD (coronary artery disease)    Myelodysplastic syndrome ([address]    AML (acute myeloblastic leukemia) ([facility]    Pancytopenia (HCC)  Resolved Problems:    * No resolved hospital problems. *           Plan:    #Patient came to ED with sore throat. Work-up was consistent with neutropenic fever. Sepsis ruled out.   Heema consult and pulmonary consult. Started on broad-spectrum antibiotics including Vanco and cefepime. Culture sent. #COVID-19 testing has been sent. Droplet plus precautions. Clinical suspicion low. 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 He sees [facility] oncology. He is on venetoclax. He is requiring frequent transfusions. Transfuse for hemoglobin less than 7 platelet count less than 9000. #Magic mouthwash for sore throat. #CAD stable. No chest pain. #Diabetes mellitus type 2 monitor sugars. He takes insulin at home. #Hypertension blood pressure stable. #SCD for DVT prophylaxis. Transfer to 2 W. Comment: Please note this report has been produced using speech recognition software and may contain errors related to that system including errors in grammar, punctuation, and spelling, as well as words and phrases that may be inappropriate. If there are any questions or concerns please feel free to contact the dictating provider for clarification. All questions and concerns were addressed to the patient/family. Alternatives to my treatment were discussed. The note was completed using EMR. Every effort was made to ensure accuracy; however, inadvertent computerized transcription errors may be present.          Cornelius Maxwell 11:51 AM 10/24/2020

## 2020-10-24 NOTE — H&P
Rales/Wheezes/Rhonchi. Cardiovascular:  Regular rate and rhythm with normal S1/S2 without murmurs, rubs or gallops. Abdomen: Soft, non-tender, non-distended with normal bowel sounds. Musculoskeletal:  No clubbing, cyanosis or edema bilaterally. Full range of motion without deformity. Skin: Skin color, texture, turgor normal.  No rashes or lesions. Neurologic:  Neurovascularly intact without any focal sensory/motor deficits. Cranial nerves: II-XII intact, grossly non-focal.  Psychiatric:  Alert and oriented, thought content appropriate, normal insight  Capillary Refill: Brisk,< 3 seconds   Peripheral Pulses: +2 palpable, equal bilaterally       Labs:     Recent Labs     10/23/20  1545   WBC 0.2*   HGB 7.7*   HCT 22.5*   PLT 9*     Recent Labs     10/23/20  1545   *   K 4.5   CL 95*   CO2 27   BUN 22*   CREATININE 1.1   CALCIUM 8.4     Recent Labs     10/23/20  1545   AST 16   ALT 18   BILITOT 1.4*   ALKPHOS 81     No results for input(s): INR in the last 72 hours. Recent Labs     10/23/20  1545 10/23/20  2300   TROPONINI 0.04* 0.02*       Urinalysis:      Lab Results   Component Value Date    NITRU Negative 10/24/2020    WBCUA 3-5 10/24/2020    BACTERIA Rare 10/24/2020    RBCUA 11-20 10/24/2020    BLOODU MODERATE 10/24/2020    SPECGRAV 1.015 10/24/2020    GLUCOSEU >=1000 10/24/2020       Radiology:          XR CHEST (2 VW)   Final Result   No acute cardiopulmonary disease.              ASSESSMENT:    Active Hospital Problems    Diagnosis Date Noted    Sepsis ([facility] [address] [A41.9] 10/23/2020         PLAN:    1) Sepsis  - trend lactic acid  - ICU  - IVFs, cefepime, vanc  - r/o covid    2) Neutropenic fever  - blood, urine cultures    3) DM  - corrective ISS    4) MPD  - usually requires prbc weekly, repeat hgb in am, currently hemodynamically stable  - Hem Onc consult        DVT Prophylaxis: thrombocytopenic  Diet: No diet orders on file  Code Status: No Order         Dispo - icu  Tot crit care time > 35 min Rito Boateng MD    Thank you No primary care provider on file. for the opportunity to be involved in this patient's care. If you have any questions or concerns please feel free to contact me at [phone].

## 2020-10-24 NOTE — CONSULTS
Pharmacy Note  Vancomycin Consult    Aundrea Wright is a 65 y.o. male started on Vancomycin for sepsis; consult received from Dr. Sherry Jean to manage therapy. Also receiving the following antibiotics: cefepime. Patient Active Problem List   Diagnosis    Chronic calculous cholecystitis    Olecranon bursitis of right elbow    Rotator cuff disorder, unspecified laterality    Tobacco abuse    Status post right rotator cuff repair    Sepsis ([address]       Allergies:  Sulfa antibiotics and Pcn [penicillins]     Temp max:     Recent Labs     10/23/20  1545   BUN 22*       Recent Labs     10/23/20  1545   CREATININE 1.1       Recent Labs     10/23/20  1545   WBC 0.2*         Intake/Output Summary (Last 24 hours) at 10/24/2020 0525  Last data filed at 10/24/2020 0100  Gross per 24 hour   Intake 80703.8 ml   Output 200 ml   Net 50483.8 ml       Culture Date      Source                       Results      Ht Readings from Last 1 Encounters:   10/23/20 6' (1.829 m)        Wt Readings from Last 1 Encounters:   10/23/20 175 lb (79.4 kg)         Body mass index is 23.73 kg/m². Estimated Creatinine Clearance: 67 mL/min (based on SCr of 1.1 mg/dL). Goal Trough Level: 15-20 mcg/mL    Assessment/Plan:  Patient received vancomycin 2 grams ivpb x1 dose at [address] on 10-23-20. Please start vancomycin 1250mg ivpb q12h at 0800 on 10-24-20. Please check vancomycin trough 10-25-20 at 0730. Thank you for the consult. Will continue to follow. [address]. Ph.  10/24/2020 5:27 AM

## 2020-10-24 NOTE — PROGRESS NOTES
Shift assessment is complete. Pt continues on 2 liters nasal canula, he continues congested and coughing up sputum. Peripheral lines remain dry/intact. He has his own N-95 from outside the hospital that he wears when healthcare team enters the room. Oncology consulted and has just rounded. Blood consent obtained and preparing to give platelets when they are ready.  VSS, telemetry SR.

## 2020-10-24 NOTE — PROGRESS NOTES
Physician Progress Note      Abena Johnson  CSN #:                  [id number]  :                       1950  ADMIT DATE:       10/23/2020 3:38 PM  [address] [place] DATE:  RESPONDING  PROVIDER #:        Shauna Chaudhry MD          QUERY TEXT:    Dear Oncology consult, and associates,    Patient admitted with sepsis and has documented pancytopenia by pulmonary   consult. If possible, please document in progress notes and discharge summary   further specificity regarding pancytopenia:    The medical record reflects the following:  Risk Factors: 65 y. o. male w/Sepsis, MDS with AML transition  Clinical Indicators: WBC: 0.2, H&H: 7.7/22.5, Plt: 9  Treatment: Monitor labs  Options provided:  -- Antineoplastic (chemotherapy) induced pancytopenia  -- Pancytopenia due to AML  -- Pancytopenia due to myelodysplastic syndrome  -- Other - I will add my own diagnosis  -- Disagree - Not applicable / Not valid  -- Disagree - Clinically unable to determine / Unknown  -- Refer to Clinical Documentation Reviewer    PROVIDER RESPONSE TEXT:    Patient has pancytopenia due to myelodysplastic syndrome.     Query created by: Brittney Colon on 10/24/2020 10:41 AM      Electronically signed by:  Shauna Chaudhry MD 10/24/2020 10:52 AM

## 2020-10-24 NOTE — CONSULTS
Reason for referral and CC: Neutropenic fever, r/o COVID    HISTORY OF PRESENT ILLNESS: 75-year-old male with a history of M PD that has transition to aplastic anemia and is followed by oncology came in with a complaint of a sore throat. He did have evidence of mucositis. His work-up showed a low-grade fever as well as severe pancytopenia. He is receiving a platelet transfusion. His blood pressure and heart rate are stable. No shortness of breath. No chest pain. No COVID-19 contacts. Low grade fever  Past Medical History:   Diagnosis Date    Back pain     CAD (coronary artery disease)     Cancer ([facility] [address]     myeloplastic luekemia    Diabetes mellitus ([facility] [facility]     Hypertension      Past Surgical History:   Procedure Laterality Date    CATARACT REMOVAL WITH IMPLANT Right 05/23/2019    [facility] EMULSIFICATION OF CATARACT WITH INTRAOCULAR w. Dr Aristeo Zhang 5/23/19    CHOLECYSTECTOMY  08/16/2016    COLONOSCOPY      HAND SURGERY Right     INTRACAPSULAR CATARACT EXTRACTION Left 4/26/2019    [facility] EMULSIFICATION OF CATARACT WITH  INTRAOCULAR LENS IMPLANT LEFT EYE performed by He Oneil MD at [facility] Right 5/23/2019    PHACO EMULSIFICATION OF CATARACT WITH INTRAOCULAR LENS IMPLANT EYE performed by He Oneil MD at [facility] ARTHROSCOP,SURG,W/ROTAT CUFF REPR Right 8/15/2018    RIGHT SHOULDER VIDEO ARTHROSCOPY, ROTATOR CUFF REPAIR, AUGMENTATION WITH ROTATION MEDICAL GRAFT, BICEPS TENODESIS, SUBACROMIAL DECOMPRESSION performed by Natalia Lo DO at [address] [address] History  family history is not on file. Social History:  reports that he quit smoking about a year ago. His smoking use included cigarettes. He started smoking about 45 years ago. He has a 45.00 pack-year smoking history. He has never used smokeless tobacco.   reports no history of alcohol use. ALLERGIES:  Patient is allergic to sulfa antibiotics and pcn [penicillins].   Continuous LEUKOCYTESUR Negative   UROBILINOGEN 2.0*   BILIRUBINUR Negative   BLOODU MODERATE*   GLUCOSEU >=1000*   AMORPHOUS 1+     No results for input(s): PHART, WDE1BWR, PO2ART in the last 72 hours.   Neg rapid flu  COIVD 19 pending  Chest imaging was reviewed by me and showed clear lungs    ASSESSMENT:  · Neutropenic fever  · Mucositis  · Acute hypoxic respiratory failure  · R/o covid 19 disease  · MDS with AML transition  · Pancytopenia  · Dm2    PLAN:  · R/o covid 19  · Magic mouthwash  · Vanc and cefepime was started  · Transfuse PLT  · SSI  · SCD  · Ok for 2 west and I will not plan to follow if covid 19 is negative    Thank you Zack Atkinson, * for this consult

## 2020-10-25 LAB
ESTIMATED AVERAGE GLUCOSE: 151.3 MG/DL
GLUCOSE BLD-MCNC: 180 MG/DL (ref 70–99)
GLUCOSE BLD-MCNC: 185 MG/DL (ref 70–99)
GLUCOSE BLD-MCNC: 211 MG/DL (ref 70–99)
GLUCOSE BLD-MCNC: 235 MG/DL (ref 70–99)
HBA1C MFR BLD: 6.9 %
ORGANISM: ABNORMAL
PERFORMED ON: ABNORMAL
PROCALCITONIN: 0.79 NG/ML (ref 0–0.15)
S PYO THROAT QL CULT: ABNORMAL
S PYO THROAT QL CULT: ABNORMAL
VANCOMYCIN TROUGH: 17.7 UG/ML (ref 10–20)

## 2020-10-25 PROCEDURE — 2580000003 HC RX 258: Performed by: INTERNAL MEDICINE

## 2020-10-25 PROCEDURE — U0003 INFECTIOUS AGENT DETECTION BY NUCLEIC ACID (DNA OR RNA); SEVERE ACUTE RESPIRATORY SYNDROME CORONAVIRUS 2 (SARS-COV-2) (CORONAVIRUS DISEASE [COVID-19]), AMPLIFIED PROBE TECHNIQUE, MAKING USE OF HIGH THROUGHPUT TECHNOLOGIES AS DESCRIBED BY CMS-2020-01-R: HCPCS

## 2020-10-25 PROCEDURE — 6370000000 HC RX 637 (ALT 250 FOR IP): Performed by: INTERNAL MEDICINE

## 2020-10-25 PROCEDURE — 6360000002 HC RX W HCPCS: Performed by: INTERNAL MEDICINE

## 2020-10-25 PROCEDURE — 1200000000 HC SEMI PRIVATE

## 2020-10-25 PROCEDURE — 84145 PROCALCITONIN (PCT): CPT

## 2020-10-25 PROCEDURE — P9035 PLATELET PHERES LEUKOREDUCED: HCPCS

## 2020-10-25 PROCEDURE — 80202 ASSAY OF VANCOMYCIN: CPT

## 2020-10-25 PROCEDURE — 36430 TRANSFUSION BLD/BLD COMPNT: CPT

## 2020-10-25 PROCEDURE — 36415 COLL VENOUS BLD VENIPUNCTURE: CPT

## 2020-10-25 PROCEDURE — 6370000000 HC RX 637 (ALT 250 FOR IP): Performed by: HOSPITALIST

## 2020-10-25 RX ORDER — 0.9 % SODIUM CHLORIDE 0.9 %
250 INTRAVENOUS SOLUTION INTRAVENOUS ONCE
Status: COMPLETED | OUTPATIENT
Start: 2020-10-25 | End: 2020-10-25

## 2020-10-25 RX ORDER — DEXTROSE AND SODIUM CHLORIDE 5; .45 G/100ML; G/100ML
INJECTION, SOLUTION INTRAVENOUS CONTINUOUS
Status: DISCONTINUED | OUTPATIENT
Start: 2020-10-25 | End: 2020-10-27 | Stop reason: HOSPADM

## 2020-10-25 RX ADMIN — CEFEPIME 2 G: 2 INJECTION, POWDER, FOR SOLUTION INTRAVENOUS at 18:04

## 2020-10-25 RX ADMIN — SODIUM CHLORIDE 250 ML: 9 INJECTION, SOLUTION INTRAVENOUS at 14:15

## 2020-10-25 RX ADMIN — VANCOMYCIN HYDROCHLORIDE 1000 MG: 1 INJECTION, POWDER, LYOPHILIZED, FOR SOLUTION INTRAVENOUS at 11:47

## 2020-10-25 RX ADMIN — DEXTROSE AND SODIUM CHLORIDE: 5; 450 INJECTION, SOLUTION INTRAVENOUS at 22:21

## 2020-10-25 RX ADMIN — VANCOMYCIN HYDROCHLORIDE 1000 MG: 1 INJECTION, POWDER, LYOPHILIZED, FOR SOLUTION INTRAVENOUS at 22:21

## 2020-10-25 RX ADMIN — SODIUM CHLORIDE 250 ML: 9 INJECTION, SOLUTION INTRAVENOUS at 09:44

## 2020-10-25 RX ADMIN — INSULIN LISPRO 1 UNITS: 100 INJECTION, SOLUTION INTRAVENOUS; SUBCUTANEOUS at 22:31

## 2020-10-25 RX ADMIN — CEFEPIME 2 G: 2 INJECTION, POWDER, FOR SOLUTION INTRAVENOUS at 05:52

## 2020-10-25 RX ADMIN — ACETAMINOPHEN 650 MG: 650 SUPPOSITORY RECTAL at 10:00

## 2020-10-25 RX ADMIN — NYSTATIN 5 ML: 500000 SUSPENSION ORAL at 12:38

## 2020-10-25 RX ADMIN — DEXTROSE AND SODIUM CHLORIDE: 5; 450 INJECTION, SOLUTION INTRAVENOUS at 14:07

## 2020-10-25 RX ADMIN — NYSTATIN 5 ML: 500000 SUSPENSION ORAL at 17:26

## 2020-10-25 RX ADMIN — NYSTATIN 5 ML: 500000 SUSPENSION ORAL at 23:07

## 2020-10-25 ASSESSMENT — PAIN DESCRIPTION - LOCATION: LOCATION: MOUTH

## 2020-10-25 ASSESSMENT — PAIN SCALES - GENERAL
PAINLEVEL_OUTOF10: 1
PAINLEVEL_OUTOF10: 9

## 2020-10-25 ASSESSMENT — PAIN DESCRIPTION - PAIN TYPE: TYPE: ACUTE PAIN

## 2020-10-25 NOTE — PROGRESS NOTES
Vanco day 2:  Trough is 17.7. No renal labs available. Will adjust to 1g Q12hrs and check a Trough 10/27 at 0930.   Henry Mendoza PharmD 10/25/2020 9:53 AM

## 2020-10-25 NOTE — PROGRESS NOTES
ONCOLOGY FOLLOW-UP:       Primary Oncologist:  Dr. Mannie Polanco [facility])    PROBLEM LIST:       Patient Active Problem List   Diagnosis Code    Chronic calculous cholecystitis K80.10    Olecranon bursitis of right elbow M70.21    Rotator cuff disorder, unspecified laterality M67.919    Tobacco abuse Z72.0    Status post right rotator cuff repair Z98.890    Sepsis ([address] A41.9    Hypertension I10    Diabetes mellitus ([address] E11.9    CAD (coronary artery disease) I25.10    Myelodysplastic syndrome ([address] D46.9    AML (acute myeloblastic leukemia) ([address] C92.00    Pancytopenia ([address] D61.818    Neutropenic fever ([address] D70.9, R50.81    Pharyngitis J02.9       INTERVAL HISTORY:       Pt transferred out of ICU. Transfused Plt yesterday. Temp 101 this AM.     REVIEW OF SYSTEMS:       10 point ROS completed. Pertinent positives in HPI, otherwise negative.      PHYSICAL EXAM:       Vitals:    10/25/20 0759   BP: 107/63   Pulse: 112   Resp: 18   Temp: 101.3 °F (38.5 °C)   SpO2: 96%       General appearance: alert and cooperative  Head: Normocephalic, without obvious abnormality, atraumatic  Neck: No palpable lymphadenopathy in supraclavicular or cervical chains  Lungs: Clear to auscultation bilaterally, no audible rales, wheezes or crackles  Heart: Regular rate and rhythm, S1, S2 normal  Abdomen: Soft, non-tender; bowel sounds normal; no masses,  no organomegaly  Extremities: without cyanosis, clubbing, edema or asymmetry  Skin: No jaundice, purpura or petechiae      LABS:     Lab Results   Component Value Date    WBC 0.1 (LL) 10/24/2020    HGB 7.3 (L) 10/24/2020    HCT 21.4 (L) 10/24/2020    MCV 88.4 10/24/2020    PLT 5 (LL) 10/24/2020       Lab Results   Component Value Date    GLUCOSE 307 (H) 10/24/2020    BUN 23 (H) 10/24/2020    CREATININE 0.7 (L) 10/24/2020    K 4.4 10/24/2020       Lab Results   Component Value Date    ALKPHOS 81 10/23/2020    ALT 18 10/23/2020    AST 16 10/23/2020    BILITOT 1.4 (H)

## 2020-10-25 NOTE — PROGRESS NOTES
Patient resting quietly in bed, denies complains of. Remains NPO. Patient with exp wheezes, clears with cough. AM assessment done.

## 2020-10-25 NOTE — ED PROVIDER NOTES
I independently interviewed, examined and evaluated Piedad Huerta. In brief,   The patient is a 66-year-old man with past medical history of myelodysplastic syndrome who presents with general malaise and trouble swallowing. Patient states that the symptoms started a few days ago. He is currently undergoing chemotherapy. He denies headache or neck swelling, denies problems moving neck. Denies fever or chills. On exam he is ill-appearing. He does have evidence of stomatitis and ulcerations of his oropharynx and hard palate. There is no neck swelling or meningeal signs. No drooling or stridor is noted. He has diffuse rhonchi, no wheezes or rales. The Ekg interpreted by me shows  sinus tachycardia, [id number]=[id number]   Axis is   Normal  QTc is  normal  Intervals and Durations are unremarkable. ST Segments: normal    ED course: Patient presents with malaise and pain with swallowing. He is ill-appearing. He is tachycardic and febrile. There is concern for sepsis. He is neutropenic with 200 white blood cells. He is thrombocytopenic with 9000 platelets. No active bleeding I do not feel there is indication for transfusion at this time. He is also anemic with hemoglobin of 7.7. Chest x-ray is negative for pneumonia, however clinically I do feel that he has pneumonia. He is covered with vancomycin and cefepime. There is also high clinical concern for Covid and this is sent, patient is also given Decadron. He will require admission for neutropenic care. He will get doses of 2.6, this cleared after fluids were given. There is no active ischemia on EKG, troponin is likely demand. Pressures are soft, although patient is not hypotensive. He does not require pressors. He does appear to have stomatitis and oropharyngeal ulcerations likely as a result of neutropenia. He will require admission. The total Critical Care time is 29 minutes which excludes separately billable procedures.       All diagnostic, treatment, and disposition decisions were made by myself in conjunction with the advanced practice provider. For all further details of the patient's emergency department visit, please see the advanced practice provider's documentation. Comment: Please note this report has been produced using speech recognition software and may contain errors related to that system including errors in grammar, punctuation, and spelling, as well as words and phrases that may be inappropriate. If there are any questions or concerns please feel free to contact the dictating provider for clarification.          Kelly, [address] [place] [address]  10/25/20 8632

## 2020-10-25 NOTE — PLAN OF CARE
Problem: Falls - Risk of:  Goal: Will remain free from falls  10/25/2020 1539 by Matt Virk RN  Outcome: Ongoing  10/25/2020 0504 by Latrice Sahu RN  Outcome: Ongoing  Goal: Absence of physical injury  10/25/2020 1539 by Matt Virk RN  Outcome: Ongoing  10/25/2020 0504 by Latrice Sahu RN  Outcome: Ongoing     Problem: Infection:  Goal: Will remain free from infection  10/25/2020 1539 by Matt Virk RN  Outcome: Ongoing  10/25/2020 0504 by Latrice Sahu RN  Outcome: Ongoing     Problem: Safety:  Goal: Free from accidental physical injury  10/25/2020 1539 by Matt Virk RN  Outcome: Ongoing  10/25/2020 0504 by Latrice Sahu RN  Outcome: Ongoing  Goal: Free from intentional harm  10/25/2020 1539 by Matt Virk RN  Outcome: Ongoing  10/25/2020 0504 by Latrice Sahu RN  Outcome: Ongoing     Problem: Daily Care:  Goal: Daily care needs are met  10/25/2020 1539 by Matt Virk RN  Outcome: Ongoing  10/25/2020 0504 by Latrice Sahu RN  Outcome: Ongoing     Problem: Pain:  Goal: Patient's pain/discomfort is manageable  10/25/2020 1539 by Matt Virk RN  Outcome: Ongoing  10/25/2020 0504 by Latrice Sahu RN  Outcome: Ongoing     Problem: Skin Integrity:  Goal: Skin integrity will stabilize  10/25/2020 1539 by Matt Virk RN  Outcome: Ongoing  10/25/2020 0504 by Latrice Sahu RN  Outcome: Ongoing     Problem: Discharge Planning:  Goal: Patients continuum of care needs are met  10/25/2020 1539 by Matt Virk RN  Outcome: Ongoing  10/25/2020 0504 by Latrice Sahu RN  Outcome: Ongoing

## 2020-10-25 NOTE — FLOWSHEET NOTE
10/24/20 2101   Vital Signs   Temp 101.4 °F (38.6 °C)   Temp Source Oral   Pulse 113   Heart Rate Source Monitor   Resp 18   /63   BP Location Right upper arm   BP Upper/Lower Upper   Patient Position Semi fowlers   Level of Consciousness 0   MEWS Score 5   Oxygen Therapy   SpO2 92 %   O2 Device Nasal cannula   O2 Flow Rate (L/min) 2 L/min       Temp elevated, no orders for tylenol. Placed ice packs to bilateral axillary. Notifed MD, requesting rectal tylenol. New orders for tylenol suppository. Audible congestion noted. LS rhonchi, end expiratory wheezing. Pt requested [employer] for suctioning excess saliva in mouth d/t unable to swallow. White patched noted on oral mucous membrane. Provided suction for comfort.

## 2020-10-25 NOTE — PROGRESS NOTES
Internal Medicine Progress Note      Interval history:     Patient admitted to hospital with a sore throat and trouble swallowing. He has a history of myeloproliferative disorder and possibly aplastic anemia. He sees hematology oncology from [facility].  He came to the hospital complains of his throat bothering him. 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 Bone marrow biopsy was done on 2020. Patient is requiring frequent blood and platelet transfusion. He is under any Venetoclax. Had a low-grade fever. He is a Covid rule out. 10/25  Patient transferred out of ICU to [place] [address] last night  Continues to spike high fevers up to 101.3 °F.   He e has significant mucositis   concerns for aspiration, he  is unable to swallow  Platelet count down again being transfused        Invasive Lines: PIV        MV:  n/a    No results for input(s): PHART, [id number], PO2ART in the last 72 hours. MV Settings:     / / /     IV:   dextrose 5 % and 0.45 % NaCl      dextrose         Vitals:  Temp  Av.3 °F (37.4 °C)  Min: 98.1 °F (36.7 °C)  Max: 101.4 °F (38.6 °C)  Pulse  Av  Min: 92  Max: 113  BP  Min: 99/60  Max: 139/75  SpO2  Av.5 %  Min: 92 %  Max: 100 %  No data found. CVP:        Intake/Output Summary (Last 24 hours) at 10/25/2020 1241  Last data filed at 10/25/2020 0422  Gross per 24 hour   Intake -100 ml   Output 300 ml   Net -400 ml       EXAM:  General: Elderly white male who appears sick. Awake and alert. Appears dehydrated   eyes: PERRL. No sclera icterus. No conjunctiva injected. ENT: No discharge. Pharynx clear. Palpable tenderness of his neck. Oral mucositis present  Neck: Trachea midline. Normal thyroid. Resp: No accessory muscle use. No crackles. No wheezing. No rhonchi. No dullness on percussion. CV: Regular rate. Regular rhythm. No mumur or rub. No edema. No JVD. Palpable pedal pulses. GI: Non-tender. Non-distended. No masses. No organmegaly. Normal bowel sounds. No hernia. Skin: Warm and dry. No nodule on exposed extremities. No rash on exposed extremities. Lymph: No cervical LAD. No supraclavicular LAD. M/S: No cyanosis. No joint deformity. No clubbing. Neuro: Awake. Follows commands. Positive pupils/gag/corneals. Normal pain response. Psych: Oriented to person, place, time. No anxiety or agitation. Medications:  Scheduled Meds:   0.9 % sodium chloride  250 mL Intravenous Once    vancomycin  1,000 mg Intravenous Q12H    cefepime  2 g Intravenous Q12H    pantoprazole  40 mg Oral Daily    Vitamin D  1,000 Units Oral Daily    vitamin E  400 Units Oral Daily    insulin lispro  0-6 Units Subcutaneous TID WC    insulin lispro  0-3 Units Subcutaneous Nightly       PRN Meds:  magic (miracle) mouthwash with nystatin, acetaminophen, glucose, dextrose, glucagon (rDNA), dextrose    Results:  CBC:   Recent Labs     10/23/20  1545 10/24/20  0659   WBC 0.2* 0.1*   HGB 7.7* 7.3*   HCT 22.5* 21.4*   MCV 87.5 88.4   PLT 9* 5*     BMP:   Recent Labs     10/23/20  1545 10/24/20  0659   * 133*   K 4.5 4.4   CL 95* 102   CO2 27 25   BUN 22* 23*   CREATININE 1.1 0.7*     LIVER PROFILE:   Recent Labs     10/23/20  1545   AST 16   ALT 18   BILITOT 1.4*   ALKPHOS 81     PT/INR: No results for input(s): PROTIME, INR in the last 72 hours. APTT: No results for input(s): APTT in the last 72 hours. UA:  Recent Labs     10/24/20  0030   COLORU Yellow   PHUR 6.0   WBCUA 3-5   RBCUA 11-20*   MUCUS 1+*   BACTERIA Rare*   CLARITYU Clear   SPECGRAV 1.015   LEUKOCYTESUR Negative   UROBILINOGEN 2.0*   BILIRUBINUR Negative   BLOODU MODERATE*   GLUCOSEU >=1000*   AMORPHOUS 1+       Cultures:  Pending. Films:    XR CHEST (2 VW)   Final Result   No acute cardiopulmonary disease.                 Assessment:    Principal Problem:    Neutropenic fever ([address]  Active Problems:    Hypertension    Diabetes mellitus ([address]    CAD (coronary artery disease)    Myelodysplastic syndrome (HCC)    AML (acute myeloblastic leukemia) ([facility]    Pancytopenia (HCC)    Pharyngitis  Resolved Problems:    * No resolved hospital problems. *           Plan:    #Patient came to ED with sore throat. Work-up was consistent with neutropenic fever. Sepsis ruled out. Heema consult and pulmonary consult. Started on broad-spectrum antibiotics including Vanco and cefepime. Continue . Culture sent. #COVID-19 testing has been sent. Droplet plus precautions. Clinical suspicion low. 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 He sees the [facility] oncology. He is on venetoclax. He is requiring frequent transfusions. Transfuse for hemoglobin less than 7 platelet count less than 9000. # Thrombocytopenia getting platelet transfusion today    #Oral mucositis   Magic mouthwash for sore throat. #Dysphagia  N.p.o. Swallow eval  Start IV fluids    #CAD stable. No chest pain. #Diabetes mellitus type 2 monitor sugars. He takes insulin at home. #Hypertension blood pressure stable. #SCD for DVT prophylaxis.             Mitra Marley 12:41 PM 10/25/2020

## 2020-10-26 LAB
ANION GAP SERPL CALCULATED.3IONS-SCNC: 7 MMOL/L (ref 3–16)
BUN BLDV-MCNC: 22 MG/DL (ref 7–20)
CALCIUM SERPL-MCNC: 8 MG/DL (ref 8.3–10.6)
CHLORIDE BLD-SCNC: 105 MMOL/L (ref 99–110)
CO2: 28 MMOL/L (ref 21–32)
CREAT SERPL-MCNC: 0.7 MG/DL (ref 0.8–1.3)
GFR AFRICAN AMERICAN: >60
GFR NON-AFRICAN AMERICAN: >60
GLUCOSE BLD-MCNC: 192 MG/DL (ref 70–99)
GLUCOSE BLD-MCNC: 203 MG/DL (ref 70–99)
GLUCOSE BLD-MCNC: 221 MG/DL (ref 70–99)
GLUCOSE BLD-MCNC: 247 MG/DL (ref 70–99)
GLUCOSE BLD-MCNC: 279 MG/DL (ref 70–99)
HCT VFR BLD CALC: 18.3 % (ref 40.5–52.5)
HCT VFR BLD CALC: 21.7 % (ref 40.5–52.5)
HEMATOLOGY PATH CONSULT: NORMAL
HEMOGLOBIN: 6.3 G/DL (ref 13.5–17.5)
HEMOGLOBIN: 7.4 G/DL (ref 13.5–17.5)
MAGNESIUM: 1.9 MG/DL (ref 1.8–2.4)
MCH RBC QN AUTO: 30.5 PG (ref 26–34)
MCHC RBC AUTO-ENTMCNC: 34.3 G/DL (ref 31–36)
MCV RBC AUTO: 88.8 FL (ref 80–100)
PDW BLD-RTO: 14.6 % (ref 12.4–15.4)
PERFORMED ON: ABNORMAL
PHOSPHORUS: 2 MG/DL (ref 2.5–4.9)
PLATELET # BLD: 23 K/UL (ref 135–450)
PMV BLD AUTO: 7.3 FL (ref 5–10.5)
POTASSIUM SERPL-SCNC: 3.6 MMOL/L (ref 3.5–5.1)
RBC # BLD: 2.07 M/UL (ref 4.2–5.9)
SODIUM BLD-SCNC: 140 MMOL/L (ref 136–145)
WBC # BLD: 0.2 K/UL (ref 4–11)

## 2020-10-26 PROCEDURE — 94761 N-INVAS EAR/PLS OXIMETRY MLT: CPT

## 2020-10-26 PROCEDURE — 6360000002 HC RX W HCPCS: Performed by: INTERNAL MEDICINE

## 2020-10-26 PROCEDURE — 36430 TRANSFUSION BLD/BLD COMPNT: CPT

## 2020-10-26 PROCEDURE — 99232 SBSQ HOSP IP/OBS MODERATE 35: CPT | Performed by: INTERNAL MEDICINE

## 2020-10-26 PROCEDURE — 2580000003 HC RX 258: Performed by: INTERNAL MEDICINE

## 2020-10-26 PROCEDURE — 2700000000 HC OXYGEN THERAPY PER DAY

## 2020-10-26 PROCEDURE — 6370000000 HC RX 637 (ALT 250 FOR IP): Performed by: INTERNAL MEDICINE

## 2020-10-26 PROCEDURE — 99233 SBSQ HOSP IP/OBS HIGH 50: CPT | Performed by: INTERNAL MEDICINE

## 2020-10-26 PROCEDURE — 1200000000 HC SEMI PRIVATE

## 2020-10-26 PROCEDURE — P9016 RBC LEUKOCYTES REDUCED: HCPCS

## 2020-10-26 PROCEDURE — 80048 BASIC METABOLIC PNL TOTAL CA: CPT

## 2020-10-26 PROCEDURE — 92610 EVALUATE SWALLOWING FUNCTION: CPT

## 2020-10-26 PROCEDURE — 85014 HEMATOCRIT: CPT

## 2020-10-26 PROCEDURE — 84100 ASSAY OF PHOSPHORUS: CPT

## 2020-10-26 PROCEDURE — 36415 COLL VENOUS BLD VENIPUNCTURE: CPT

## 2020-10-26 PROCEDURE — 83735 ASSAY OF MAGNESIUM: CPT

## 2020-10-26 PROCEDURE — 6370000000 HC RX 637 (ALT 250 FOR IP): Performed by: HOSPITALIST

## 2020-10-26 PROCEDURE — 92526 ORAL FUNCTION THERAPY: CPT

## 2020-10-26 PROCEDURE — 85027 COMPLETE CBC AUTOMATED: CPT

## 2020-10-26 PROCEDURE — 85018 HEMOGLOBIN: CPT

## 2020-10-26 RX ORDER — 0.9 % SODIUM CHLORIDE 0.9 %
250 INTRAVENOUS SOLUTION INTRAVENOUS ONCE
Status: COMPLETED | OUTPATIENT
Start: 2020-10-26 | End: 2020-10-26

## 2020-10-26 RX ADMIN — ACETAMINOPHEN 650 MG: 650 SUPPOSITORY RECTAL at 22:30

## 2020-10-26 RX ADMIN — SODIUM CHLORIDE 250 ML: 9 INJECTION, SOLUTION INTRAVENOUS at 10:05

## 2020-10-26 RX ADMIN — CEFEPIME 2 G: 2 INJECTION, POWDER, FOR SOLUTION INTRAVENOUS at 17:37

## 2020-10-26 RX ADMIN — NYSTATIN 5 ML: 500000 SUSPENSION ORAL at 17:37

## 2020-10-26 RX ADMIN — CEFEPIME 2 G: 2 INJECTION, POWDER, FOR SOLUTION INTRAVENOUS at 05:40

## 2020-10-26 RX ADMIN — VANCOMYCIN HYDROCHLORIDE 1000 MG: 1 INJECTION, POWDER, LYOPHILIZED, FOR SOLUTION INTRAVENOUS at 22:30

## 2020-10-26 RX ADMIN — DEXTROSE AND SODIUM CHLORIDE: 5; 450 INJECTION, SOLUTION INTRAVENOUS at 22:29

## 2020-10-26 RX ADMIN — INSULIN LISPRO 1 UNITS: 100 INJECTION, SOLUTION INTRAVENOUS; SUBCUTANEOUS at 21:00

## 2020-10-26 RX ADMIN — VANCOMYCIN HYDROCHLORIDE 1000 MG: 1 INJECTION, POWDER, LYOPHILIZED, FOR SOLUTION INTRAVENOUS at 11:29

## 2020-10-26 ASSESSMENT — PAIN SCALES - GENERAL: PAINLEVEL_OUTOF10: 7

## 2020-10-26 NOTE — PROGRESS NOTES
myeloid leukemia (AML) 30% of the total cellularity    - Iron deposits are present.    - Reticulin stain shows mild, patchy increase in reticulin fibers (grade 1    out of 3)    - See comment.      ASSESSMENT:     Problem List Items Addressed This Visit     Pharyngitis      Other Visit Diagnoses     Septicemia ([facility]    -  Primary    Neutropenia, unspecified type ([facility]                    PLAN:     1. 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 Amish Stephens at [facility]  - on Venetoclax  - requires frequent transfusions  - transfuse for Hgb < 7, Plt < 10  - give PRBC today with Hgb 6.3    2. Neutropenic Fever  - empiric vanc/cefepime   - checking cultures  - R/O COVID-19  - consider adding antifungal if fever persists  - fevers improving    3.  Mucositis  - Michell Robles MD

## 2020-10-26 NOTE — PROGRESS NOTES
Spoke with daughter Travis Morales and provided update regarding transfer to [facility] daughter aware that facility has accepted patient but must wait on Covid results to come back before patient can be transferred. Daughter verbalized understanding.

## 2020-10-26 NOTE — PROGRESS NOTES
* 133* 140   K 4.5 4.4 3.6   CL 95* 102 105   CO2 27 25 28   PHOS  --   --  2.0*   BUN 22* 23* 22*   CREATININE 1.1 0.7* 0.7*     LIVER PROFILE:   Recent Labs     10/23/20  1545   AST 16   ALT 18   BILITOT 1.4*   ALKPHOS 81     PT/INR: No results for input(s): PROTIME, INR in the last 72 hours. APTT: No results for input(s): APTT in the last 72 hours. UA:  Recent Labs     10/24/20  0030   COLORU Yellow   PHUR 6.0   WBCUA 3-5   RBCUA 11-20*   MUCUS 1+*   BACTERIA Rare*   CLARITYU Clear   SPECGRAV 1.015   LEUKOCYTESUR Negative   UROBILINOGEN 2.0*   BILIRUBINUR Negative   BLOODU MODERATE*   GLUCOSEU >=1000*   AMORPHOUS 1+     No results for input(s): PHART, KQP6WRO, PO2ART in the last 72 hours.   Cultures:   10/23 flu negative  10/24 BC NGTD    Films:  CXR 10/23 reviewed by me and showed   Mild interstitial infiltrates       ASSESSMENT:  · Acute hypoxemic  respiratory failure   · Neutropenic fever  · Mucositis  · MDS with AML transition  · Pancytopenia        PLAN:  · Supplemental oxygen to maintain SaO2 >92%; wean as tolerated  · Droplet plus isolation   · Vancomycin and cefepime day #4  · SCDs  · SSI

## 2020-10-26 NOTE — PLAN OF CARE
Problem: Falls - Risk of:  Goal: Will remain free from falls  Description: Will remain free from falls  10/26/2020 1201 by Peyton Ayon RN  Outcome: Ongoing    Goal: Absence of physical injury  Description: Absence of physical injury  10/26/2020 1201 by Peyton Ayon RN  Outcome: Ongoing       Problem: Infection:  Goal: Will remain free from infection  Description: Will remain free from infection  10/26/2020 1201 by Peytno Ayon RN  Outcome: Ongoing    Problem: Daily Care:  Goal: Daily care needs are met  Description: Daily care needs are met  10/26/2020 1201 by Peyton Ayon RN  Outcome: Ongoing    Problem: Pain:  Goal: Patient's pain/discomfort is manageable  Description: Patient's pain/discomfort is manageable  10/26/2020 1201 by Peyton Ayon RN  Outcome: Ongoing    Goal: Pain level will decrease  Description: Pain level will decrease  10/26/2020 1201 by Peyton Ayon RN  Outcome: Ongoing    Problem: Skin Integrity:  Goal: Skin integrity will stabilize  Description: Skin integrity will stabilize  10/26/2020 1201 by Peyton yAon RN  Outcome: Ongoing       Problem: Discharge Planning:  Goal: Patients continuum of care needs are met  Description: Patients continuum of care needs are met  10/26/2020 1201 by Peyton Ayon RN  Outcome: Ongoing

## 2020-10-26 NOTE — PROGRESS NOTES
Speech Language Pathology  Facility/Department: [address] MEDICAL-SURGICAL   CLINICAL BEDSIDE SWALLOW EVALUATION    Recommend Continue strict NPO, ice chips for comfort. Meds via alternate means. Will need further swallow instrumental assessment FEES/MBS, potential ENT referral given severity of dysphagia: pending COVID19 test results. NAME: Rafat Resendiz  : 1950  MRN: [id number]    ADMISSION DATE: 10/23/2020  ADMITTING DIAGNOSIS: has Chronic calculous cholecystitis; Olecranon bursitis of right elbow; Rotator cuff disorder, unspecified laterality; Tobacco abuse; Status post right rotator cuff repair; Sepsis ([address] Hypertension; Diabetes mellitus ([address] CAD (coronary artery disease); Myelodysplastic syndrome ([address] AML (acute myeloblastic leukemia) ([address] Pancytopenia ([address] Neutropenic fever ([address] and Pharyngitis on their problem list.  ONSET DATE: 10/23/20    Recent Chest Xray/CT of Chest:   Chest x-ray 10/23/20  Impression    No acute cardiopulmonary disease. Date of Eval: 10/26/2020  Evaluating Therapist: Rene Sanches    Current Diet level:  Current Diet : NPO  Current Liquid Diet : NPO;Ice chips      Primary Complaint  Patient Complaint: trouble swallowing, odynophagia    Pain:  Pain Assessment  Pain Assessment: 0-10  Pain Level: 7  Pain Type: Acute pain  Pain Location: (swallowing)  Response to Pain Intervention: Asleep with RR greater than 10  RASS Score: Alert and calm    Reason for Referral  Rafat Case was referred for a bedside swallow evaluation to assess the efficiency of his swallow function, identify signs and symptoms of aspiration and make recommendations regarding safe dietary consistencies, effective compensatory strategies, and safe eating environment. Impression  Dysphagia Diagnosis: Severe pharyngeal stage dysphagia  Dysphagia Outcome Severity Scale: Level 1: Severe dysphagia- NPO.  Unable to tolerate any PO safely       ASSESSMENT:  Patient presents with severe dysphagia. Unable to consume any PO safety at this time. He requests ice chips for comfort despite continued coughing material back up. Patient reports odynophagia of 7-8/10, reports pain with voicing/speaking. Speech resonance is impaired, and intensity reduced. Patient noted with white yeast like substance in oropharynx, soft and hard palate. Material was easily removed with swabs. Possible pharyngeal/laryngeal edema secondary to the above symptoms. PO trials revealed delayed swallow timing, more significantly with x2 puree (quarter teaspoon). Hyolaryngeal elevation was assessed as significantly reduced. Upon swallowing bolus patient with immediate cough and apparent bolus recovered and suctioned from oral cavity 100% of the time. Patient verbalized that he understanding the risks of aspiration and recommendations for NPO, however then continued to ask for SLP to leave water and applesauce so he could continue to try it. Recommend Continue strict NPO, ice chips for comfort. Meds via alternate means. Will need further swallow instrumental assessment FEES/MBS, potential ENT referral given severity of dysphagia: pending COVID19 test results. Treatment Plan  Requires SLP Intervention: Yes  Duration/Frequency of Treatment: 3-5x/week for LOS  D/C Recommendations: To be determined  Referral To: Dysphagia evaluation;ENT    Recommended Diet and Intervention  Diet Solids Recommendation: NPO  Liquid Consistency Recommendation: NPO  Recommended Form of Meds: Other  Recommendations: FEES;Modified barium swallow study       Treatment/Goals  Long-term Goals  Timeframe for Long-term Goals: 3-5x week for LOS  Goal 1: Patient will progress to advanced diet without s/s of aspiration or penetration 90% of the time  Dysphagia Goals: The patient will tolerate repeat BSE when able. ;The patient will tolerate instrumental swallowing procedure    General  Comments:  The patient is a 69-year-old man with past medical history of myelodysplastic syndrome who presents with general malaise and trouble swallowing. Patient states that the symptoms started a few days ago. He is currently undergoing chemotherapy. He denies headache or neck swelling, denies problems moving neck. Denies fever or chills. On exam he is ill-appearing. He does have evidence of stomatitis and ulcerations of his oropharynx and hard palate. Subjective  Subjective: RN reports patient is complaining of not being able to eat, but then refuses medications because he cant swallow. Patient awake and alert upon entering. Behavior/Cognition: Alert; Cooperative  Respiratory Status: O2 via nasual cannula  O2 Device: Nasal cannula  Liters of Oxygen: 2 L  Communication Observation: Functional  Follows Directions: Complex  Dentition: Some missing teeth;Edentulous  Patient Positioning: Upright in bed  Baseline Vocal Quality: Dysphonic(impaired resonance, weak)  Volitional Cough: Strong  Volitional Swallow: Delayed  Prior Dysphagia History: n/a  Consistencies Administered: Thin - cup; Ice Chips;Dysphagia Pureed (Dysphagia I)      Pain Level: 7    Vision/Hearing  Vision  Vision: Impaired  Hearing  Hearing: Within functional limits    Oral Motor Deficits  Oral/Motor  Oral Motor: Within functional limits    Oral Phase Dysfunction  Oral Phase  Oral Phase: WFL     Indicators of Pharyngeal Phase Dysfunction   Pharyngeal Phase  Pharyngeal Phase: Exceptions  Indicators of Pharyngeal Phase Dysfunction  Delayed Swallow: All  Decreased Laryngeal Elevation: All  Wet Vocal Quality: Puree  Throat Clearing - Immediate: All  Cough - Immediate:  All    Prognosis  Prognosis  Prognosis for safe diet advancement: guarded  Barriers to reach goals: severity of dysphagia  Individuals consulted  Consulted and agree with results and recommendations: Patient;RN    Education  Patient Education: PAtient educated regarding role of SLP, basic anatomy and physiology of the swallow, risks associated with aspiration, s/s of aspiration, and SLP POC. Patient Education Response: Verbalizes understanding;Needs reinforcement  Safety Devices in place: Yes  Type of devices: Call light within reach;Nurse notified     Patient was left partially reclined, with call light in reach, all need met.  Safety handoff completed with RN, who verbalized understanding         Therapy Time  SLP Individual Minutes  Time In: 0845  Time Out: 5693  Minutes: 26   Dysphagia eval x1  Dysphagia treatment x1          [address] .[address]  Speech Language Pathologist  10/26/2020

## 2020-10-26 NOTE — PLAN OF CARE
Problem: Falls - Risk of:  Goal: Will remain free from falls  Description: Will remain free from falls  10/26/2020 0538 by Mj Bolivar RN  Outcome: Ongoing  10/25/2020 1539 by Lilliana Malcolm RN  Outcome: Ongoing  Goal: Absence of physical injury  Description: Absence of physical injury  10/26/2020 0538 by Mj Bolivar RN  Outcome: Ongoing  10/25/2020 1539 by Lilliana Malcolm RN  Outcome: Ongoing     Problem: Infection:  Goal: Will remain free from infection  Description: Will remain free from infection  10/26/2020 0538 by Mj Bolivar RN  Outcome: Ongoing  10/25/2020 1539 by Lilliana Malcolm RN  Outcome: Ongoing     Problem: Safety:  Goal: Free from accidental physical injury  Description: Free from accidental physical injury  10/26/2020 0538 by Mj Bolivar RN  Outcome: Ongoing  10/25/2020 1539 by Lilliana Malcolm RN  Outcome: Ongoing  Goal: Free from intentional harm  Description: Free from intentional harm  10/26/2020 0538 by Mj Bolivar RN  Outcome: Ongoing  10/25/2020 1539 by Lilliana Malcolm RN  Outcome: Ongoing     Problem: Daily Care:  Goal: Daily care needs are met  Description: Daily care needs are met  10/26/2020 0538 by Mj Bolivar RN  Outcome: Ongoing  10/25/2020 1539 by Lilliana Malcolm RN  Outcome: Ongoing     Problem: Pain:  Goal: Patient's pain/discomfort is manageable  Description: Patient's pain/discomfort is manageable  10/26/2020 0538 by Mj Bolivar RN  Outcome: Ongoing  10/25/2020 1539 by Lilliana Malcolm RN  Outcome: Ongoing  Goal: Pain level will decrease  Description: Pain level will decrease  Outcome: Ongoing  Goal: Control of acute pain  Description: Control of acute pain  Outcome: Ongoing  Goal: Control of chronic pain  Description: Control of chronic pain  Outcome: Ongoing     Problem: Skin Integrity:  Goal: Skin integrity will stabilize  Description: Skin integrity will stabilize  10/26/2020 0538 by Eldon DASHAWN Ramos  Outcome: Ongoing  10/25/2020 1539 by Alyce Chan RN  Outcome: Ongoing     Problem: Discharge Planning:  Goal: Patients continuum of care needs are met  Description: Patients continuum of care needs are met  10/26/2020 0538 by Bridgette Arciniega RN  Outcome: Ongoing  10/25/2020 1539 by Alyce Chan RN  Outcome: Ongoing

## 2020-10-26 NOTE — FLOWSHEET NOTE
Attempted to call Pt's room for telephonic  visit. No answer.     [address] [facility]       10/26/20 1036   Encounter Summary   Services provided to: Patient not available   Referral/Consult From: [facility]   (10/26 attempted call, no answer)

## 2020-10-26 NOTE — PROGRESS NOTES
Vancomycin Day: 5    Patient's labs, cultures, vitals, and vancomycin regimen reviewed. No changes today.   Trough due on 27th at [address] [facility] [address] D 10/26/08502:09 PM  .

## 2020-10-26 NOTE — PROGRESS NOTES
Pt daughter Dale Law ([phone]) wants pt transferred to [facility] with Dr Luan Anguiano. [phone] because that is the group that had him hospitalized for 3 weeks when he first had his diagnosis. I explained the procedure for getting him acepted and the possibility that the transport will ask for money due to pt preference/request. Pts daughter states she feels we are not capable to care for dad and she would like the transport request if they have a request for payment emailed to her for her  to review. Plan of care here was reviewed with daughter.    I told her we would move forward and let her know if they have a request.  Mitchell Dillard RN

## 2020-10-26 NOTE — CARE COORDINATION
Case Management Assessment  Initial Evaluation      Patient Name: Kelsi Woodruff  YOB: 1950  Diagnosis: Sepsis [facility]) [A41.9]  Date / Time: 10/23/2020  3:38 PM    Admission status/Date: IP 10/23/2020  Chart Reviewed: Yes      Patient Vargas Carcamo: Yes per phone due to C19 restrictions  Family Interviewed:  No      Hospitalization in the last 30 days:       Health Care Decision Maker : BYRON Rivers (daughter)      Met with: patient   Neena Bhatia conducted  (bedside/phone):phone    Current PCP: Oncology @ [facility] [address] required for SNF : YES          3 night stay required - WAIVED    ADLS  Support Systems/Care Needs: Spouse/Significant Other, Children  Transportation: self    Meal Preparation: self    Housing  Living Arrangements: lives alone, IPTA  Steps: 3 steps into the house  Intent for return to present living arrangements: Yes  Identified Issues: Plans to TXF to [place] [address] with Home Health Care : No Agency: NA     Passport/Waiver : No  :                      Phone Number:    Passport/Waiver Services: NA          Durable Medical Equiptment   DME Provider: [facility]  Equipment:   Walker___Cane___RTS___ BSC___Shower Chair___Hospital Bed___W/C____Other________  02 at ____Liter(s)---wears(frequency)_______ HHN ___ CPAP___ BiPap___   N/A_X___      Home O2 Use :  No  Supplemental O2 in ED. No home O2. Pt will likely TXF to [facility] (Oncology)      Community Service Affiliation  Dialysis:  No    · Agency:  · Location:  · Dialysis Schedule:  · Phone:   · Fax: Other Community Services: [facility] [place] [facility] [place]    DISCHARGE PLAN: Explained Case Management role/services. Chart reviewed. Met with pt via phone due to C19 and neutrapenic precautions and explained the role of the CM. IPTA. Lives alone. Plans to transfer to [facility].  [facility] has accepted the patient and is now awaiting C19 PCR before pt can be

## 2020-10-26 NOTE — PROGRESS NOTES
Report & transfer of care given to Amrik Hutson, [facility]. Amrik Hutson, RN aware of panic h/h 6.3/18. 3. 0317

## 2020-10-26 NOTE — PLAN OF CARE
Problem: Nutrition  Intervention: Aspiration precautions  Note: SLP completed evaluation. Please refer to notes in EMR. Problem: Nutrition  Intervention: Swallowing evaluation  Note: Patient educated on role of ST. Please see ST evaluation and treatment notes for specific information re: plan of care, interventions provided and progress toward goals.        Cathy Mortensen MS CCC-SLP  [place] .[address]  Speech Language Pathologist  10/26/2020

## 2020-10-26 NOTE — PROGRESS NOTES
Received call from [facility] regarding how soon pt's covid test results will be back. Per access center depending on when test results are back [facility] may accept patient without results back. Spoke with Luisa Rowley in lab and she reports that pt's covid test is being completed at [facility] at this time and should be back in 2-3 hours. Spoke with [facility] and made them aware of how long until results will be back, they spoke with access center at [facility] and they wish to wait until covid test is resulted.

## 2020-10-26 NOTE — DISCHARGE SUMMARY
Name:  Nito Puckett  Room:  1513/2931-94  MRN:    [id number]    Discharge Summary      This discharge summary is in conjunction with a complete physical exam done on the day of discharge. Discharging Physician: Dr. Harriet Ibarra: 10/23/2020  Discharge: 10/27/2020     HPI taken from admission H&P:    65 y.o. male presents with a 3 day history of sore throat, chills. Denies fever, sob though he has had some coughing when attempting to swallow solids/liquids. Throat soreness has limited his ability to tolerate PO. He denies abdominal pain, diarrhea, nausea, vomiting. He denies dysuria, frequency, chest pain. Currently he is seen and examined on O2, no respiratory distress. Diagnoses this Admission and Hospital Course   Strep Throat  - Patient came to ED with sore throat  - Sepsis ruled out.  - Hematology/oncology consult and pulmonary consult.    - Started on broad-spectrum antibiotics including Vanc/cefepime  - Continue   - Culture sent.     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    - Transfuse for hemoglobin less than 7 platelet count less than <10   - s/p PRBC transfusion   - Family is requesting transfer to [facility] for primary oncologist to follow patient-->Dr. Shaina Porter agreeable for transfer of care, coordinated with clinical      Thrombocytopenia   - s/p platelet transfusion     Oral mucositis   - Magic mouthwash for sore throat.     Dysphagia  - N.p.o.  - Swallow eval  - Start IV fluids     CAD stable  - No chest pain.     Diabetes mellitus type 2   - monitor sugars  - He takes insulin at home.      Hypertension   - blood pressure stable.     Procedures (Please Review Full Report for Details)  S/p PRBC, platelets     Consults    Hem/onc    Physical Exam at Discharge:    /63   Pulse 107   Temp 99.4 °F (37.4 °C) (Oral)   Resp 18   Ht 6' (1.829 m)   Wt 175 lb (79.4 kg)   SpO2 97%   BMI 23.73 kg/m²     General: Elderly white male who appears sick. Awake and alert. Appears dehydrated   eyes: PERRL. No sclera icterus. No conjunctiva injected. ENT: No discharge. Pharynx clear. Palpable tenderness of his neck. Oral mucositis present  Neck: Trachea midline. Normal thyroid. Resp: No accessory muscle use. No crackles. No wheezing. No rhonchi. No dullness on percussion. CV: Regular rate. Regular rhythm. No mumur or rub. No edema. No JVD. Palpable pedal pulses. GI: Non-tender. Non-distended. No masses. No organmegaly. Normal bowel sounds. No hernia. Skin: Warm and dry. No nodule on exposed extremities. No rash on exposed extremities. Lymph: No cervical LAD. No supraclavicular LAD. M/S: No cyanosis. No joint deformity. No clubbing. Neuro: Awake. Follows commands. Positive pupils/gag/corneals. Normal pain response. Psych: Oriented to person, place, time. No anxiety or agitation.      CBC:   Recent Labs     10/23/20  1545 10/24/20  0659 10/26/20  0639   WBC 0.2* 0.1* 0.2*   HGB 7.7* 7.3* 6.3*   HCT 22.5* 21.4* 18.3*   MCV 87.5 88.4 88.8   PLT 9* 5* 23*     BMP:   Recent Labs     10/23/20  1545 10/24/20  0659 10/26/20  0639   * 133* 140   K 4.5 4.4 3.6   CL 95* 102 105   CO2 27 25 28   PHOS  --   --  2.0*   BUN 22* 23* 22*   CREATININE 1.1 0.7* 0.7*     LIVER PROFILE:   Recent Labs     10/23/20  1545   AST 16   ALT 18   BILITOT 1.4*   ALKPHOS 81     UA:  Recent Labs     10/24/20  0030   COLORU Yellow   PHUR 6.0   WBCUA 3-5   RBCUA 11-20*   MUCUS 1+*   BACTERIA Rare*   CLARITYU Clear   SPECGRAV 1.015   LEUKOCYTESUR Negative   UROBILINOGEN 2.0*   BILIRUBINUR Negative   BLOODU MODERATE*   GLUCOSEU >=1000*   AMORPHOUS 1+     CULTURES  Blood Cx: NGTD  Rapid Strep A: Negative   Rapid flu: negative   Strep A Culture: + Beta Strep Group F  COVID-19: not detected     RADIOLOGY  XR CHEST (2 VW)   Final Result   No acute cardiopulmonary disease. Discharge Medications     Medication List      ASK your doctor about these medications    atorvastatin 80 MG tablet  Commonly known as:  LIPITOR     b complex vitamins capsule     NONFORMULARY     * INSULIN ASPART SC     * NovoLOG 100 UNIT/ML injection vial  Generic drug:  insulin aspart     pantoprazole 40 MG tablet  Commonly known as:  PROTONIX     vitamin D 1000 UNIT Tabs tablet  Commonly known as:  CHOLECALCIFEROL     vitamin E 400 UNIT capsule         * This list has 2 medication(s) that are the same as other medications prescribed for you. Read the directions carefully, and ask your doctor or other care provider to review them with you. Transferred in stable condition to [facility] for continued care with patients primary oncologist per family request     WERO Peña.

## 2020-10-27 ENCOUNTER — APPOINTMENT (OUTPATIENT)
Dept: GENERAL RADIOLOGY | Age: 70
DRG: 808 | End: 2020-10-27
Payer: MEDICARE

## 2020-10-27 VITALS
HEIGHT: 72 IN | DIASTOLIC BLOOD PRESSURE: 59 MMHG | OXYGEN SATURATION: 96 % | TEMPERATURE: 101.3 F | SYSTOLIC BLOOD PRESSURE: 123 MMHG | RESPIRATION RATE: 18 BRPM | HEART RATE: 114 BPM | WEIGHT: 175 LBS | BODY MASS INDEX: 23.7 KG/M2

## 2020-10-27 LAB
ALBUMIN SERPL-MCNC: 2.5 G/DL (ref 3.4–5)
ALP BLD-CCNC: 62 U/L (ref 40–129)
ALT SERPL-CCNC: 75 U/L (ref 10–40)
ANION GAP SERPL CALCULATED.3IONS-SCNC: 8 MMOL/L (ref 3–16)
AST SERPL-CCNC: 48 U/L (ref 15–37)
BILIRUB SERPL-MCNC: 1.1 MG/DL (ref 0–1)
BILIRUBIN DIRECT: 0.7 MG/DL (ref 0–0.3)
BILIRUBIN, INDIRECT: 0.4 MG/DL (ref 0–1)
BLOOD BANK DISPENSE STATUS: NORMAL
BLOOD BANK PRODUCT CODE: NORMAL
BLOOD CULTURE, ROUTINE: NORMAL
BPU ID: NORMAL
BUN BLDV-MCNC: 22 MG/DL (ref 7–20)
CALCIUM SERPL-MCNC: 7.8 MG/DL (ref 8.3–10.6)
CHLORIDE BLD-SCNC: 105 MMOL/L (ref 99–110)
CO2: 26 MMOL/L (ref 21–32)
CREAT SERPL-MCNC: 0.8 MG/DL (ref 0.8–1.3)
CULTURE, BLOOD 2: NORMAL
DESCRIPTION BLOOD BANK: NORMAL
GFR AFRICAN AMERICAN: >60
GFR NON-AFRICAN AMERICAN: >60
GLUCOSE BLD-MCNC: 166 MG/DL (ref 70–99)
GLUCOSE BLD-MCNC: 231 MG/DL (ref 70–99)
GLUCOSE BLD-MCNC: 238 MG/DL (ref 70–99)
GLUCOSE BLD-MCNC: 247 MG/DL (ref 70–99)
HCT VFR BLD CALC: 19.8 % (ref 40.5–52.5)
HEMOGLOBIN: 6.7 G/DL (ref 13.5–17.5)
MAGNESIUM: 1.9 MG/DL (ref 1.8–2.4)
MCH RBC QN AUTO: 29.8 PG (ref 26–34)
MCHC RBC AUTO-ENTMCNC: 33.8 G/DL (ref 31–36)
MCV RBC AUTO: 88.1 FL (ref 80–100)
PDW BLD-RTO: 14.9 % (ref 12.4–15.4)
PERFORMED ON: ABNORMAL
PHOSPHORUS: 1.7 MG/DL (ref 2.5–4.9)
PLATELET # BLD: 13 K/UL (ref 135–450)
PMV BLD AUTO: 6.6 FL (ref 5–10.5)
POTASSIUM SERPL-SCNC: 3.3 MMOL/L (ref 3.5–5.1)
PROCALCITONIN: 0.97 NG/ML (ref 0–0.15)
RBC # BLD: 2.25 M/UL (ref 4.2–5.9)
REPORT: NORMAL
RESPIRATORY PANEL PCR: NORMAL
SARS-COV-2, PCR: NOT DETECTED
SODIUM BLD-SCNC: 139 MMOL/L (ref 136–145)
TOTAL PROTEIN: 6.6 G/DL (ref 6.4–8.2)
VANCOMYCIN TROUGH: 12.6 UG/ML (ref 10–20)
WBC # BLD: 0.2 K/UL (ref 4–11)

## 2020-10-27 PROCEDURE — 2580000003 HC RX 258

## 2020-10-27 PROCEDURE — 2500000003 HC RX 250 WO HCPCS: Performed by: INTERNAL MEDICINE

## 2020-10-27 PROCEDURE — 2580000003 HC RX 258: Performed by: INTERNAL MEDICINE

## 2020-10-27 PROCEDURE — 85027 COMPLETE CBC AUTOMATED: CPT

## 2020-10-27 PROCEDURE — 84100 ASSAY OF PHOSPHORUS: CPT

## 2020-10-27 PROCEDURE — 6360000002 HC RX W HCPCS: Performed by: INTERNAL MEDICINE

## 2020-10-27 PROCEDURE — 80048 BASIC METABOLIC PNL TOTAL CA: CPT

## 2020-10-27 PROCEDURE — 80202 ASSAY OF VANCOMYCIN: CPT

## 2020-10-27 PROCEDURE — 84145 PROCALCITONIN (PCT): CPT

## 2020-10-27 PROCEDURE — 36415 COLL VENOUS BLD VENIPUNCTURE: CPT

## 2020-10-27 PROCEDURE — 83735 ASSAY OF MAGNESIUM: CPT

## 2020-10-27 PROCEDURE — P9035 PLATELET PHERES LEUKOREDUCED: HCPCS

## 2020-10-27 PROCEDURE — 99239 HOSP IP/OBS DSCHRG MGMT >30: CPT | Performed by: INTERNAL MEDICINE

## 2020-10-27 PROCEDURE — 6370000000 HC RX 637 (ALT 250 FOR IP): Performed by: HOSPITALIST

## 2020-10-27 PROCEDURE — 71045 X-RAY EXAM CHEST 1 VIEW: CPT

## 2020-10-27 PROCEDURE — 99233 SBSQ HOSP IP/OBS HIGH 50: CPT | Performed by: INTERNAL MEDICINE

## 2020-10-27 PROCEDURE — 80076 HEPATIC FUNCTION PANEL: CPT

## 2020-10-27 PROCEDURE — 36430 TRANSFUSION BLD/BLD COMPNT: CPT

## 2020-10-27 PROCEDURE — 0202U NFCT DS 22 TRGT SARS-COV-2: CPT

## 2020-10-27 RX ORDER — 0.9 % SODIUM CHLORIDE 0.9 %
250 INTRAVENOUS SOLUTION INTRAVENOUS ONCE
Status: COMPLETED | OUTPATIENT
Start: 2020-10-27 | End: 2020-10-27

## 2020-10-27 RX ORDER — VORICONAZOLE 200 MG/1
200 TABLET, FILM COATED ORAL EVERY 12 HOURS SCHEDULED
Status: DISCONTINUED | OUTPATIENT
Start: 2020-10-27 | End: 2020-10-27 | Stop reason: HOSPADM

## 2020-10-27 RX ORDER — SODIUM CHLORIDE 9 MG/ML
INJECTION, SOLUTION INTRAVENOUS
Status: COMPLETED
Start: 2020-10-27 | End: 2020-10-27

## 2020-10-27 RX ADMIN — DEXTROSE MONOHYDRATE 500 MG: 50 INJECTION, SOLUTION INTRAVENOUS at 09:02

## 2020-10-27 RX ADMIN — VANCOMYCIN HYDROCHLORIDE 1000 MG: 1 INJECTION, POWDER, LYOPHILIZED, FOR SOLUTION INTRAVENOUS at 12:03

## 2020-10-27 RX ADMIN — METRONIDAZOLE 500 MG: 500 INJECTION, SOLUTION INTRAVENOUS at 16:18

## 2020-10-27 RX ADMIN — ACETAMINOPHEN 650 MG: 650 SUPPOSITORY RECTAL at 13:27

## 2020-10-27 RX ADMIN — SODIUM CHLORIDE 250 ML: 9 INJECTION, SOLUTION INTRAVENOUS at 13:38

## 2020-10-27 RX ADMIN — CEFEPIME 2 G: 2 INJECTION, POWDER, FOR SOLUTION INTRAVENOUS at 05:10

## 2020-10-27 RX ADMIN — SODIUM CHLORIDE: 9 INJECTION, SOLUTION INTRAVENOUS at 15:30

## 2020-10-27 RX ADMIN — CEFEPIME 2 G: 2 INJECTION, POWDER, FOR SOLUTION INTRAVENOUS at 17:53

## 2020-10-27 ASSESSMENT — PAIN SCALES - GENERAL
PAINLEVEL_OUTOF10: 0
PAINLEVEL_OUTOF10: 3
PAINLEVEL_OUTOF10: 0

## 2020-10-27 NOTE — PLAN OF CARE
Problem: Falls - Risk of:  Goal: Will remain free from falls  Description: Will remain free from falls  10/27/2020 0010 by Aminata Marino RN  Outcome: Ongoing  10/26/2020 1201 by Antolin Warren RN  Outcome: Ongoing  Goal: Absence of physical injury  Description: Absence of physical injury  10/27/2020 0010 by Aminata Marino RN  Outcome: Ongoing  10/26/2020 1201 by Antolin Warren RN  Outcome: Ongoing     Problem: Infection:  Goal: Will remain free from infection  Description: Will remain free from infection  10/27/2020 0010 by Aminata Marino RN  Outcome: Ongoing  10/26/2020 1201 by Antolin Warren RN  Outcome: Ongoing     Problem: Safety:  Goal: Free from accidental physical injury  Description: Free from accidental physical injury  Outcome: Ongoing  Goal: Free from intentional harm  Description: Free from intentional harm  Outcome: Ongoing     Problem: Daily Care:  Goal: Daily care needs are met  Description: Daily care needs are met  10/27/2020 0010 by Aminata Marino RN  Outcome: Ongoing  10/26/2020 1201 by Antolin Warren RN  Outcome: Ongoing     Problem: Pain:  Goal: Patient's pain/discomfort is manageable  Description: Patient's pain/discomfort is manageable  10/27/2020 0010 by Aminata Marino RN  Outcome: Ongoing  10/26/2020 1201 by Antolin Warren RN  Outcome: Ongoing  Goal: Pain level will decrease  Description: Pain level will decrease  10/27/2020 0010 by Aminata Marino RN  Outcome: Ongoing  10/26/2020 1201 by Antolin Warren RN  Outcome: Ongoing  Goal: Control of acute pain  Description: Control of acute pain  Outcome: Ongoing  Goal: Control of chronic pain  Description: Control of chronic pain  Outcome: Ongoing     Problem: Skin Integrity:  Goal: Skin integrity will stabilize  Description: Skin integrity will stabilize  10/27/2020 0010 by Aminata Marino RN  Outcome: Ongoing  10/26/2020 1201 by Antolin Warren RN  Outcome: Ongoing     Problem: Discharge Planning:  Goal: Patients continuum of care needs are met  Description: Patients continuum of care needs are met  10/27/2020 0010 by Norberto Higginbotham RN  Outcome: Ongoing  10/26/2020 1201 by Wiliam Hines RN  Outcome: Ongoing     Problem: Nutrition  Intervention: Swallowing evaluation  10/26/2020 1010 by CELIA Beltran  Note: Patient educated on role of Avis Estrada Dr. Please see ST evaluation and treatment notes for specific information re: plan of care, interventions provided and progress toward goals. Intervention: Aspiration precautions  10/26/2020 1011 by CELIA Beltran  Note: SLP completed evaluation. Please refer to notes in EMR.        Problem: Skin Integrity:  Goal: Will show no infection signs and symptoms  Description: Will show no infection signs and symptoms  Outcome: Ongoing  Goal: Absence of new skin breakdown  Description: Absence of new skin breakdown  Outcome: Ongoing

## 2020-10-27 NOTE — PROGRESS NOTES
Shift assessment complete; see flow sheet. Scheduled medications administered; See MAR. IV infusing without difficulty. O2 3 LPM nc in place. Complete linen change. Pt. Fever of 102.5; given tylenol and cool wash cloths placed on forehead, back of neck, and under left armpit for now. Will recheck temperature under right armpit. Covers removed except for one light blanket. Pt denies any needs at this time. Call light within reach, bed in low locked position. Will continue to monitor.

## 2020-10-27 NOTE — PROGRESS NOTES
Pharmacy Vancomycin Consult     Vancomycin Day: 5  Current Dosinmg q12h    Temp max:      Recent Labs     10/26/20  0639 10/27/20  0646   BUN 22* 22*       Recent Labs     10/26/20  0639 10/27/20  0646   CREATININE 0.7* 0.8       Recent Labs     10/26/20  0639 10/27/20  0646   WBC 0.2* 0.2*         Intake/Output Summary (Last 24 hours) at 10/27/2020 1225  Last data filed at 10/27/2020 7480  Gross per 24 hour   Intake --   Output 550 ml   Net -550 ml       Culture Date      Source                       Results  Blood: NGTD  Throat- Strep    Ht Readings from Last 1 Encounters:   10/23/20 6' (1.829 m)        Wt Readings from Last 1 Encounters:   10/23/20 175 lb (79.4 kg)         Body mass index is 23.73 kg/m². Estimated Creatinine Clearance: 92 mL/min (based on SCr of 0.8 mg/dL).     Trough: 12.6mcg/ml    Assessment/Plan:  Continue with same rx  Gabriel SOLOMON 10/27/971759:27 PM  .

## 2020-10-27 NOTE — PROGRESS NOTES
ONCOLOGY FOLLOW-UP:       Primary Oncologist:  Dr. [facility])    PROBLEM LIST:       Patient Active Problem List   Diagnosis Code    Chronic calculous cholecystitis K80.10    Olecranon bursitis of right elbow M70.21    Rotator cuff disorder, unspecified laterality M67.919    Tobacco abuse Z72.0    Status post right rotator cuff repair Z98.890    Sepsis ([address] A41.9    Hypertension I10    Diabetes mellitus ([address] E11.9    CAD (coronary artery disease) I25.10    Myelodysplastic syndrome ([address] D46.9    AML (acute myeloblastic leukemia) ([address] C92.00    Pancytopenia ([facility] D61.818    Neutropenic fever ([address] D70.9, R50.81    Pharyngitis J02.9    Acute hypoxemic respiratory failure (HCC) J96.01    Mucositis K12.30       INTERVAL HISTORY:       Pt still having fevers. Tachycardic this AM. Wants to be transferred to [address] [place] [address] Drive:       10 point ROS completed. Pertinent positives in HPI, otherwise negative.        PHYSICAL EXAM:       Vitals:    10/27/20 0815   BP: 114/61   Pulse: 115   Resp: 18   Temp: 99.1 °F (37.3 °C)   SpO2: 98%     General appearance: alert and cooperative  Head: Normocephalic, without obvious abnormality, atraumatic  Neck: No palpable lymphadenopathy in supraclavicular or cervical chains  Lungs: Clear to auscultation bilaterally, no audible rales, wheezes or crackles  Heart: Regular rate and rhythm, S1, S2 normal  Abdomen: Soft, non-tender; bowel sounds normal; no masses,  no organomegaly  Extremities: without cyanosis, clubbing, edema or asymmetry  Skin: No jaundice, purpura or petechiae    LABS:     Lab Results   Component Value Date    WBC 0.2 (LL) 10/27/2020    HGB 6.7 (LL) 10/27/2020    HCT 19.8 (LL) 10/27/2020    MCV 88.1 10/27/2020    PLT 13 (LL) 10/27/2020       Lab Results   Component Value Date    GLUCOSE 231 (H) 10/27/2020    BUN 22 (H) 10/27/2020    CREATININE 0.8 10/27/2020    K 3.3 (L) 10/27/2020    PHOS 1.7 (L) 10/27/2020       Lab Results   Component Value Date    ALKPHOS 62 10/27/2020    ALT 75 (H) 10/27/2020    AST 48 (H) 10/27/2020    BILITOT 1.1 (H) 10/27/2020    BILIDIR 0.7 (H) 10/27/2020    PROT 6.6 10/27/2020       IMAGING:     Xr Chest (2 Vw)  Result Date: 10/23/2020  No acute cardiopulmonary disease. PATHOLOGY:     BMBX 9/16/2020:    FINAL DIAGNOSIS:        Bone marrow, trephine biopsy, aspirate and clot:    - Hypercellular bone marrow (80% overall cellularity) with moderately    reduced trilinear hematopoiesis and sheets of blasts consistent with acute    myeloid leukemia (AML) 30% of the total cellularity    - Iron deposits are present.    - Reticulin stain shows mild, patchy increase in reticulin fibers (grade 1    out of 3)    - See comment.      ASSESSMENT:     Problem List Items Addressed This Visit     Pharyngitis      Other Visit Diagnoses     Septicemia ([facility]    -  Primary    Neutropenia, unspecified type ([facility]                    PLAN:     1. 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 Trudy Daniel at [facility]  - on Venetoclax  - requires frequent transfusions  - transfuse for Hgb < 7, Plt < 10  - give PRBC and Plt today    2. Neutropenic Fever  - empiric vanc/cefepime and voriconazole  - checking cultures  - R/O COVID-19  - discussed with BMT physician at [facility] who would not recommend Granix with 30% blasts on BMBX last month    3.  Mucositis  - Michell Ng MD

## 2020-10-27 NOTE — PROGRESS NOTES
edema. No JVD. Palpable pedal pulses. GI: Non-tender. Non-distended. No masses. No organmegaly. Normal bowel sounds. No hernia. Skin: Warm and dry. No nodule on exposed extremities. No rash on exposed extremities. Lymph: No cervical LAD. No supraclavicular LAD. M/S: No cyanosis. No joint deformity. No clubbing. Neuro: Awake. Follows commands. Positive pupils/gag/corneals. Normal pain response. Psych: Oriented to person, place, time. No anxiety or agitation. Medications:  Scheduled Meds:   azithromycin  500 mg Intravenous Q24H    voriconazole  200 mg Oral 2 times per day    0.9 % sodium chloride  250 mL Intravenous Once    metroNIDAZOLE  500 mg Intravenous Q8H    vancomycin  1,000 mg Intravenous Q12H    cefepime  2 g Intravenous Q12H    pantoprazole  40 mg Oral Daily    Vitamin D  1,000 Units Oral Daily    vitamin E  400 Units Oral Daily    insulin lispro  0-6 Units Subcutaneous TID WC    insulin lispro  0-3 Units Subcutaneous Nightly       PRN Meds:  magic (miracle) mouthwash with nystatin, acetaminophen, glucose, dextrose, glucagon (rDNA), dextrose    Results:  CBC:   Recent Labs     10/26/20  0639 10/26/20  1355 10/27/20  0646   WBC 0.2*  --  0.2*   HGB 6.3* 7.4* 6.7*   HCT 18.3* 21.7* 19.8*   MCV 88.8  --  88.1   PLT 23*  --  13*     BMP:   Recent Labs     10/26/20  0639 10/27/20  0646    139   K 3.6 3.3*    105   CO2 28 26   PHOS 2.0* 1.7*   BUN 22* 22*   CREATININE 0.7* 0.8     LIVER PROFILE:   Recent Labs     10/27/20  0646   AST 48*   ALT 75*   BILIDIR 0.7*   BILITOT 1.1*   ALKPHOS 62     PT/INR: No results for input(s): PROTIME, INR in the last 72 hours. APTT: No results for input(s): APTT in the last 72 hours. UA:  No results for input(s): NITRITE, COLORU, PHUR, LABCAST, WBCUA, RBCUA, MUCUS, TRICHOMONAS, YEAST, BACTERIA, CLARITYU, SPECGRAV, LEUKOCYTESUR, UROBILINOGEN, BILIRUBINUR, BLOODU, GLUCOSEU, AMORPHOUS in the last 72 hours.     Invalid input(s): KETONESU    Cultures:  Pending. Films:    XR CHEST PORTABLE   Final Result   Faint perihilar opacities may simply be related to vascular congestion on   this limited low lung volume portable chest x-ray although viral pneumonia   could have this appearance. No evidence of pneumonia otherwise or other   acute pulmonary process. There is no focal dense consolidation. XR CHEST (2 VW)   Final Result   No acute cardiopulmonary disease. Assessment:    Principal Problem:    Neutropenic fever ([address]  Active Problems:    Hypertension    Diabetes mellitus ([address]    CAD (coronary artery disease)    Myelodysplastic syndrome ([address]    AML (acute myeloblastic leukemia) ([address]    Pancytopenia (HCC)    Pharyngitis    Acute hypoxemic respiratory failure (HCC)    Mucositis    Diarrhea  Resolved Problems:    * No resolved hospital problems. *           Plan:    #Patient came to ED with sore throat. Work-up was consistent with neutropenic fever. Sepsis ruled out. Hem onc consult and pulmonary consult. Started on broad-spectrum antibiotics including Vanco and cefepime. Continue abx  . Culture sent. #COVID-19 testing has been sent. Droplet plus precautions. Clinical suspicion low. 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 He sees the [facility] oncology. He is on venetoclax. He is requiring frequent transfusions. Transfuse for hemoglobin less than 7 platelet count less than 9000. # Thrombocytopenia gotplatelet transfusion yesterday    #Oral mucositis   Magic mouthwash for sore throat. #Dysphagia  N.p.o. Swallow eval  Start IV fluids    #CAD stable. No chest pain. #Diabetes mellitus type 2 monitor sugars. He takes insulin at home. #Hypertension blood pressure stable. #SCD for DVT prophylaxis.             Jody Preciado 1:15 PM 10/27/2020

## 2020-10-27 NOTE — PROGRESS NOTES
Platelett transfusion complete. No signs or symptoms of reactions. VS taken. 101.3 F, 123/59, 95% on 3L, 114BPM, RR18.

## 2020-10-27 NOTE — PLAN OF CARE
Problem: Infection:  Goal: Will remain free from infection  Description: Will remain free from infection  Outcome: Ongoing     Problem: Skin Integrity:  Goal: Will show no infection signs and symptoms  Description: Will show no infection signs and symptoms  Outcome: Ongoing  Goal: Absence of new skin breakdown  Description: Absence of new skin breakdown  Outcome: Ongoing

## 2020-10-27 NOTE — PROGRESS NOTES
Reinforced use call light. 8 minutes into transfusion and no transfusion reaction present.  Will continue to monitor

## 2020-10-27 NOTE — DISCHARGE INSTR - COC
Continuity of Care Form    Patient Name: Andrés Jameson   :  1950  MRN:  [id number]    Admit date:  10/23/2020  Discharge date: 10/27/2020  Code Status Order: No Order   Advance Directives:   [facility] Documentation     Date/Time Healthcare Directive Type of Healthcare Directive Copy in [facility] [address] Agent's Name Healthcare Agent's Phone Number    10/24/20 0005  No, patient does not have an advance directive for healthcare treatment -- -- -- -- --          Admitting Physician:  Main Siddiqui MD  PCP: No primary care provider on file. Discharging Nurse: [facility]/Room#: 6485/7946-24  Discharging Unit Phone Number: ***    Emergency Contact:   Extended Emergency Contact Information  Primary Emergency Contact: Veronica Ingram  Address: [address] [place] [address] Phone: [phone]  Relation: Child  Secondary Emergency Contact: Orquideaadarsh Chan  Mobile Phone: [phone]  Relation: Other    Past Surgical History:  Past Surgical History:   Procedure Laterality Date    CATARACT REMOVAL WITH IMPLANT Right 2019    [facility]ACO EMULSIFICATION OF CATARACT WITH INTRAOCULAR w.  Dr Johnson Clear 19    CHOLECYSTECTOMY  2016    COLONOSCOPY      HAND SURGERY Right     INTRACAPSULAR CATARACT EXTRACTION Left 2019    PHACO EMULSIFICATION OF CATARACT WITH  INTRAOCULAR LENS IMPLANT LEFT EYE performed by Kp Sood MD at [address] [place] [address] Right 2019    PHACO EMULSIFICATION OF CATARACT WITH INTRAOCULAR LENS IMPLANT EYE performed by Kp Sood MD at [facility] Right 8/15/2018    RIGHT SHOULDER VIDEO ARTHROSCOPY, ROTATOR CUFF REPAIR, AUGMENTATION WITH ROTATION MEDICAL GRAFT, BICEPS TENODESIS, SUBACROMIAL DECOMPRESSION performed by Clau Villarreal DO at [place] [address]       Immunization History: There is no immunization history on file for this patient.     Active Problems:  Patient Active Problem List   Diagnosis Code    Chronic calculous cholecystitis K80.10    Olecranon bursitis of right elbow M70.21    Rotator cuff disorder, unspecified laterality M67.919    Tobacco abuse Z72.0    Status post right rotator cuff repair Z98.890    Sepsis ([facility] A41.9    Hypertension I10    Diabetes mellitus ([facility] E11.9    CAD (coronary artery disease) I25.10    Myelodysplastic syndrome ([facility] D46.9    AML (acute myeloblastic leukemia) ([facility] C92.00    Pancytopenia ([facility] D61.818    Neutropenic fever (HCC) D70.9, R50.81    Pharyngitis J02.9    Acute hypoxemic respiratory failure (HCC) J96.01    Mucositis K12.30    Diarrhea R19.7       Isolation/Infection:   Isolation          No Isolation        Patient Infection Status     Infection Onset Added Last Indicated Last Indicated By Review Planned Expiration Resolved Resolved By    C-diff Rule Out 10/27/20 10/27/20 10/27/20 Clostridium difficile toxin/antigen (Ordered)        Resolved    COVID-19 Rule Out 10/23/20 10/23/20 10/27/20 Respiratory Panel, Molecular, with COVID-19 (Restricted: peds pts or suitable admitted adults) (Ordered)   10/27/20 Rule-Out Test Resulted          Nurse Assessment:  Last Vital Signs: BP (!) 123/59   Pulse 114   Temp 101.3 °F (38.5 °C) (Axillary)   Resp 18   Ht 6' (1.829 m)   Wt 175 lb (79.4 kg)   SpO2 96%   BMI 23.73 kg/m²     Last documented pain score (0-10 scale): Pain Level: 0  Last Weight:   Wt Readings from Last 1 Encounters:   10/23/20 175 lb (79.4 kg)     Mental Status:  oriented, alert and coherent    IV Access:  - Peripheral IV - site  R Forearm, insertion date: 10/23/2020    Nursing Mobility/ADLs:  Walking   Assisted  Transfer  Independent  Bathing  Assisted  Dressing  Assisted  Toileting  Dependent  Feeding  Dependent  Med Admin  Assisted  Med Delivery   none    Wound Care Documentation and Therapy:  Incision 08/16/16 Abdomen (Active)   Number of days: 1533       Incision 08/15/18 Back Right (Active)   Number of days: 728        Elimination:  Continence:   · Bowel: No  · Bladder: Yes  Urinary Catheter: None   Colostomy/Ileostomy/Ileal Conduit: No       Date of Last BM: 10/27/2020    Intake/Output Summary (Last 24 hours) at 10/27/2020 1815  Last data filed at 10/27/2020 1605  Gross per 24 hour   Intake 509 ml   Output 700 ml   Net -191 ml     I/O last 3 completed shifts:  In: -   Out: 700 [Urine:700]    Safety Concerns: At Risk for Falls    Impairments/Disabilities:      None    Nutrition Therapy:  Current Nutrition Therapy:   - NPO    Routes of Feeding: None  Liquids: NPO  Daily Fluid Restriction: NPO  Last Modified Barium Swallow with Video (Video Swallowing Test): done on 10/27    Treatments at the Time of Hospital Discharge:   Respiratory Treatments: NO    Oxygen Therapy:  is on oxygen at 2 L/min per nasal cannula.   Ventilator:    - No ventilator support    Rehab Therapies: Speech/Language Therapy  Weight Bearing Status/Restrictions: No weight bearing restirctions  Other Medical Equipment (for information only, NOT a DME order):  ***  Other Treatments: ***    Patient's personal belongings (please select all that are sent with patient):  Cell phone    RN SIGNATURE:  Electronically signed by Lindsay Jeffrey RN on 10/27/20 at 6:20 PM EDT    CASE MANAGEMENT/SOCIAL WORK SECTION    Inpatient Status Date: ***    Readmission Risk Assessment Score:  Readmission Risk              Risk of Unplanned Readmission:        18           Discharging to Facility/ Agency   · Name:   · Address:  · Phone:  · Fax:    Dialysis Facility (if applicable)   · Name:  · Address:  · Dialysis Schedule:  · Phone:  · Fax:    / signature: {Esignature:[id number]}    PHYSICIAN SECTION    Prognosis: {Prognosis:7043542758}    Condition at Discharge: [address] [place] [address] Patient Condition:[id number]}    Rehab Potential (if transferring to Rehab): {Prognosis:2902487940}    Recommended Labs or Other Treatments After Discharge: ***    Physician Certification: I certify the above information and transfer of Latasha Edwards  is necessary for the continuing treatment of the diagnosis listed and that he requires {Admit to Appropriate Level of Care:01298} for {GREATER/LESS:880413788} 30 days.      Update Admission H&P: {CHP DME Changes in AWALF:536056771}    PHYSICIAN SIGNATURE:  {Esignature:[id number]}

## 2020-10-27 NOTE — PROGRESS NOTES
Awake, in bed. Incontinent of B&B. Stool is soft, pasty/brown. Cleaned and repositioned. coccyx noted to be red, blanchable. educated on importance of staying off coccyx as much as possible. positioned to left side with pillow support.

## 2020-10-27 NOTE — PROGRESS NOTES
Pulmonary Progress Note    CC: Fever rule out Covid    Subjective: Tachycardic  Diarrhea  Still febrile  Pending transfer to [facility]        Intake/Output Summary (Last 24 hours) at 10/27/2020 4127  Last data filed at 10/27/2020 0516  Gross per 24 hour   Intake --   Output 400 ml   Net -400 ml       Exam:   BP (!) 115/51   Pulse 123   Temp 100.9 °F (38.3 °C) (Oral)   Resp 18   Ht 6' (1.829 m)   Wt 175 lb (79.4 kg)   SpO2 98%   BMI 23.73 kg/m²  on 2LPM   Gen: Mild distress. Alert. Eyes: PERRL. No sclera icterus. No conjunctival injection. ENT: No discharge. Pharynx clear. Neck: Trachea midline. Normal thyroid. Resp: + accessory muscle use. Bilateral crackles. No wheezes. No rhonchi. No dullness on percussion. CV: Tachycardia. Regular rhythm. No murmur or rub. No edema. GI: Non-tender. Non-distended. No masses. No organomegaly. Normal bowel sounds. No hernia. Skin: Warm and dry. No nodule on exposed extremities. No rash on exposed extremities. Lymph: No cervical LAD. No supraclavicular LAD. M/S: No cyanosis. No joint deformity. No clubbing. Neuro: Awake. Moves all extremities. CN grossly intact. Psych: Oriented x 3. No anxiety or agitation.      Scheduled Meds:   vancomycin  1,000 mg Intravenous Q12H    cefepime  2 g Intravenous Q12H    pantoprazole  40 mg Oral Daily    Vitamin D  1,000 Units Oral Daily    vitamin E  400 Units Oral Daily    insulin lispro  0-6 Units Subcutaneous TID     insulin lispro  0-3 Units Subcutaneous Nightly     Continuous Infusions:   dextrose 5 % and 0.45 % NaCl 75 mL/hr at 10/26/20 2229    dextrose       PRN Meds:  magic (miracle) mouthwash with nystatin, acetaminophen, glucose, dextrose, glucagon (rDNA), dextrose    Labs:  CBC:   Recent Labs     10/26/20  0639 10/26/20  1355 10/27/20  0646   WBC 0.2*  --  0.2*   HGB 6.3* 7.4* 6.7*   HCT 18.3* 21.7* 19.8*   MCV 88.8  --  88.1   PLT 23*  --  13*     BMP:   Recent Labs     10/26/20  6033 10/27/20  0646    139   K 3.6 3.3*    105   CO2 28 26   PHOS 2.0* 1.7*   BUN 22* 22*   CREATININE 0.7* 0.8     LIVER PROFILE:   No results for input(s): AST, ALT, LIPASE, BILIDIR, BILITOT, ALKPHOS in the last 72 hours. Invalid input(s): AMYLASE,  ALB  PT/INR: No results for input(s): PROTIME, INR in the last 72 hours. APTT: No results for input(s): APTT in the last 72 hours. UA:  No results for input(s): NITRITE, COLORU, PHUR, LABCAST, WBCUA, RBCUA, MUCUS, TRICHOMONAS, YEAST, BACTERIA, CLARITYU, SPECGRAV, LEUKOCYTESUR, UROBILINOGEN, BILIRUBINUR, BLOODU, GLUCOSEU, AMORPHOUS in the last 72 hours. Invalid input(s): KETONESU  No results for input(s): PHART, DEU0HAR, PO2ART in the last 72 hours. Cultures:   10/23 flu negative  10/24 BC NGTD    Films:  CXR 10/27 reviewed by me and showed   Perihilar airspace disease      ASSESSMENT:  · Acute hypoxemic respiratory failure   · Neutropenic fever  · Diarrhea-rule out C. difficile  · Mucositis  · MDS with AML transition  · Pancytopenia-worsening        PLAN:  · Supplemental oxygen to maintain SaO2 >92%; wean as tolerated  · Droplet plus isolation   · Follow COVID-19 PCR  · Vancomycin and cefepime day #5  · Add Zithromax, Vanco p.o. and voriconazole  · Could probably discontinue vancomycin IV  · Stool C.  Difficile  · We will consider bronchoscopy if no improvement in Covid is ruled out  · Repeat cultures  · SCDs  · SSI  · Discussed with internal medicine and hematology

## 2020-10-27 NOTE — PROGRESS NOTES
Pt informed of transport and out of pocket costs. Waiting for pt's cell phone to charge to get a new phone number for the pt's daughter.

## 2020-10-27 NOTE — PROGRESS NOTES
Report given to DASHAWN Ennis at [facility]. Awaiting transportation to be arranged.

## 2020-10-27 NOTE — PROGRESS NOTES
Internal Medicine Progress Note for 10/27      Interval history:     Patient admitted to hospital with a sore throat and trouble swallowing. He has a history of myeloproliferative disorder and possibly aplastic anemia. He sees hematology oncology from [facility].  He came to the hospital complains of his throat bothering him. 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 Bone marrow biopsy was done on 2020. Patient is requiring frequent blood and platelet transfusion. He is under any Venetoclax. Had a low-grade fever. He is a Covid rule out. 10/25  Patient transferred out of ICU to [place] [address] last night  Continues to spike high fevers up to 101.3 °F.   He e has significant mucositis   concerns for aspiration, he  is unable to swallow  Platelet count down again being transfused    10/26  Continues to have fevers     10/27   high grade fevers   having diarrhea   COVID negative    Invasive Lines: PIV        MV:  n/a    No results for input(s): PHART, NIS1LWS, PO2ART in the last 72 hours. MV Settings:     / / /     IV:   dextrose 5 % and 0.45 % NaCl 75 mL/hr at 10/26/20 2229    dextrose         Vitals:  Temp  Av.8 °F (38.2 °C)  Min: 99.1 °F (37.3 °C)  Max: 102.5 °F (39.2 °C)  Pulse  Av.7  Min: 100  Max: 123  BP  Min: 110/60  Max: 128/62  SpO2  Av.2 %  Min: 91 %  Max: 98 %  No data found. CVP:        Intake/Output Summary (Last 24 hours) at 10/27/2020 1318  Last data filed at 10/27/2020 1203  Gross per 24 hour   Intake --   Output 700 ml   Net -700 ml       EXAM:  General: Elderly white male who appears sick. Awake and alert. Appears dehydrated   eyes: PERRL. No sclera icterus. No conjunctiva injected. ENT: No discharge. Pharynx clear. Palpable tenderness of his neck. Oral mucositis present  Neck: Trachea midline. Normal thyroid. Resp: No accessory muscle use. No crackles. No wheezing. No rhonchi.  No dullness on percussion. CV: Regular rate. Regular rhythm. No mumur or rub. No edema. No JVD. Palpable pedal pulses. GI: mild diffuse tenderness . Non-distended. No masses. No organmegaly. Normal bowel sounds. No hernia. Skin: Warm and dry. No nodule on exposed extremities. No rash on exposed extremities. Lymph: No cervical LAD. No supraclavicular LAD. M/S: No cyanosis. No joint deformity. No clubbing. Neuro: Awake. Follows commands. Positive pupils/gag/corneals. Normal pain response. Psych: Oriented to person, place, time. No anxiety or agitation. Medications:  Scheduled Meds:   azithromycin  500 mg Intravenous Q24H    voriconazole  200 mg Oral 2 times per day    0.9 % sodium chloride  250 mL Intravenous Once    metroNIDAZOLE  500 mg Intravenous Q8H    vancomycin  1,000 mg Intravenous Q12H    cefepime  2 g Intravenous Q12H    pantoprazole  40 mg Oral Daily    Vitamin D  1,000 Units Oral Daily    vitamin E  400 Units Oral Daily    insulin lispro  0-6 Units Subcutaneous TID WC    insulin lispro  0-3 Units Subcutaneous Nightly       PRN Meds:  magic (miracle) mouthwash with nystatin, acetaminophen, glucose, dextrose, glucagon (rDNA), dextrose    Results:  CBC:   Recent Labs     10/26/20  0639 10/26/20  1355 10/27/20  0646   WBC 0.2*  --  0.2*   HGB 6.3* 7.4* 6.7*   HCT 18.3* 21.7* 19.8*   MCV 88.8  --  88.1   PLT 23*  --  13*     BMP:   Recent Labs     10/26/20  0639 10/27/20  0646    139   K 3.6 3.3*    105   CO2 28 26   PHOS 2.0* 1.7*   BUN 22* 22*   CREATININE 0.7* 0.8     LIVER PROFILE:   Recent Labs     10/27/20  0646   AST 48*   ALT 75*   BILIDIR 0.7*   BILITOT 1.1*   ALKPHOS 62     PT/INR: No results for input(s): PROTIME, INR in the last 72 hours. APTT: No results for input(s): APTT in the last 72 hours.   UA:  No results for input(s): NITRITE, COLORU, PHUR, LABCAST, [address], RBCUA, MUCUS, TRICHOMONAS, YEAST, BACTERIA, CLARITYU, SPECGRAV, LEUKOCYTESUR, UROBILINOGEN, BILIRUBINUR, BLOODU, [address] in the last 72 hours. Invalid input(s): KETONESU    Cultures:  Pending. Films:    XR CHEST PORTABLE   Final Result   Faint perihilar opacities may simply be related to vascular congestion on   this limited low lung volume portable chest x-ray although viral pneumonia   could have this appearance. No evidence of pneumonia otherwise or other   acute pulmonary process. There is no focal dense consolidation. XR CHEST (2 VW)   Final Result   No acute cardiopulmonary disease. Assessment:    Principal Problem:    Neutropenic fever ([address]  Active Problems:    Hypertension    Diabetes mellitus ([address]    CAD (coronary artery disease)    Myelodysplastic syndrome ([address]    AML (acute myeloblastic leukemia) ([address]    Pancytopenia (HCC)    Pharyngitis    Acute hypoxemic respiratory failure (HCC)    Mucositis    Diarrhea  Resolved Problems:    * No resolved hospital problems. *           Plan:    #Patient came to ED with sore throat. Work-up was consistent with neutropenic fever. Sepsis ruled out. Hem onc consult and pulmonary consult. Started on broad-spectrum antibiotics including Vanco and cefepime. Continue abx  . Culture sent. continues to have fevers. Add Voriconazole. Azithromycin to cover the atypicals. IV flagyl for possible C. Diff. he is NPO- hence unable to start oral vanc     #COVID-19 testing has been sent. Droplet plus precautions. Clinical suspicion low. Negative. 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 He sees the [facility] oncology. He is on venetoclax. He is requiring frequent transfusions. Transfuse for hemoglobin less than 7 . # Thrombocytopenia. platelet transfusion again today    #Oral mucositis   Magic mouthwash for sore throat. #Dysphagia  N.p.o. Swallow eval  Start IV fluids    #CAD stable. No chest pain. #Diabetes mellitus type 2 monitor sugars.   He takes insulin at

## 2020-10-27 NOTE — PROGRESS NOTES
Handoff report and transfer of care given to [address] and [facility] Pt in stable condition. Call light within reach. Bed locked in lowest position. Denies further needs at this time.

## 2020-10-27 NOTE — PROGRESS NOTES
Pt demonstrates incontinence of stool - able to tell when he needs to go but not able to hold it in. Pt is now clean and resting in bed. Temperature is up again. Will continue to monitor. Encouraging pt to take a second tylenol suppository and educated him on the need to keep his temperature stable.

## 2020-10-28 LAB — BLOOD CULTURE, ROUTINE: NORMAL

## 2020-10-29 LAB
FINAL REPORT: NORMAL
PRELIMINARY: NORMAL

## 2021-01-13 NOTE — ED NOTES
Patient given ice chips, ok'd per [facility] - YASMIN Miller RN  10/23/20 [address] at bedside evaluating patient.      Farhana Miller RN  10/23/20 2981 POST-OP DIAGNOSIS:  History of breast cancer 13-Jan-2021 14:45:18  David Wade

## (undated) DEVICE — SURGICAL PROCEDURE PACK EYE CLERMONT

## (undated) DEVICE — 6 ML SYRINGE LUER-LOCK TIP: Brand: MONOJECT

## (undated) DEVICE — PREP SOL PVP IODINE 4%  4 OZ/BTL

## (undated) DEVICE — Z INACTIVE NO SUPPLIER SOLUTIONIRRIG 3000ML 0.9% SOD CHL FLX CONT [79720808] [HOSPIRA WORLDWIDE INC]

## (undated) DEVICE — SUTURE VCRL SZ 2-0 L27IN ABSRB UD L26MM SH 1/2 CIR J417H

## (undated) DEVICE — NEEDLE HYPO 25GA L1.5IN BLU POLYPR HUB S STL REG BVL STR

## (undated) DEVICE — Device

## (undated) DEVICE — AMIENT MEGAVAC 90 WAND: Brand: COBLATION

## (undated) DEVICE — BLADE SHV L13CM DIA5.5MM BNE CUT AGG DEB COOLCUT

## (undated) DEVICE — SHOULDER STABILIZATION KIT,                                    DISPOSABLE 12 PER BOX

## (undated) DEVICE — GLOVE ORANGE PI 8   MSG9080

## (undated) DEVICE — PASSER SUT FOR OMNISPAN MENIS REP SYS CHIA PERCPASS

## (undated) DEVICE — 3M™ DURAPORE™ SURGICAL TAPE 1538-3, 3 INCH X 10 YARD (7,5CM X 9,1M), 4 ROLLS/BOX: Brand: 3M™ DURAPORE™

## (undated) DEVICE — SOLUTION IV IRRIG WATER 1000ML POUR BRL 2F7114

## (undated) DEVICE — CLEARCUT® SLIT KNIFE INTREPID MICRO-COAXIAL SYSTEM 2.2 SB: Brand: CLEARCUT®; INTREPID

## (undated) DEVICE — MEDI-VAC NON-CONDUCTIVE SUCTION TUBING: Brand: CARDINAL HEALTH

## (undated) DEVICE — SUTURE ETHLN SZ 3-0 L18IN NONABSORBABLE BLK PS-2 L19MM 3/8 1669H

## (undated) DEVICE — TUBING PMP L6FT CONT WAVE EXTN

## (undated) DEVICE — SMARTGOWN SURGICAL GOWN, XL: Brand: CONVERTORS

## (undated) DEVICE — GLOVE SURG SZ 7 L12IN FNGR THK94MIL STD WHT ISOLEX LTX FREE

## (undated) DEVICE — 1010 S-DRAPE TOWEL DRAPE 10/BX: Brand: STERI-DRAPE™

## (undated) DEVICE — GAUZE,SPONGE,4"X4",8PLY,STRL,LF,10/TRAY: Brand: MEDLINE

## (undated) DEVICE — CANNULA NSL 13FT TUBE AD ETCO2 DIV SAMP M

## (undated) DEVICE — 3M™ MEDIPORE™ SOFT CLOTH TAPE, 4 INCH X 10 YARDS, 12 ROLLS/CASE, 2964: Brand: 3M™ MEDIPORE™

## (undated) DEVICE — BLADE SHV L13CM DIA4MM EXCALIBUR AGG COOLCUT

## (undated) DEVICE — NEEDLE SPNL 18GA L3IN S STL REG WALL FIT STYL PNK HUB W/

## (undated) DEVICE — DRAPE 70X60IN SPLIT IMPERV ADHES STRIP

## (undated) DEVICE — PAD FELT FOR ARTHROVAC FLR SUCT SYS

## (undated) DEVICE — TUBING PMP L16FT MAIN DISP FOR AR-6400 AR-6475

## (undated) DEVICE — STERILE LATEX POWDER-FREE SURGICAL GLOVESWITH NITRILE COATING: Brand: PROTEXIS

## (undated) DEVICE — MAT TRACK 40 X 72 IN ABSORBENT

## (undated) DEVICE — SHUTTLE SUT 90DEG UP CHIA IDEAL